# Patient Record
Sex: FEMALE | Race: WHITE | NOT HISPANIC OR LATINO | Employment: UNEMPLOYED | URBAN - METROPOLITAN AREA
[De-identification: names, ages, dates, MRNs, and addresses within clinical notes are randomized per-mention and may not be internally consistent; named-entity substitution may affect disease eponyms.]

---

## 2018-04-04 ENCOUNTER — OFFICE VISIT (OUTPATIENT)
Dept: INTERNAL MEDICINE CLINIC | Facility: CLINIC | Age: 33
End: 2018-04-04
Payer: COMMERCIAL

## 2018-04-04 VITALS
SYSTOLIC BLOOD PRESSURE: 164 MMHG | DIASTOLIC BLOOD PRESSURE: 88 MMHG | TEMPERATURE: 99.7 F | HEART RATE: 128 BPM | OXYGEN SATURATION: 99 % | BODY MASS INDEX: 21.99 KG/M2 | WEIGHT: 128.8 LBS | HEIGHT: 64 IN | RESPIRATION RATE: 16 BRPM

## 2018-04-04 DIAGNOSIS — Z13.6 SCREENING FOR HEART DISEASE: ICD-10-CM

## 2018-04-04 DIAGNOSIS — F41.9 ANXIETY DISORDER, UNSPECIFIED TYPE: ICD-10-CM

## 2018-04-04 DIAGNOSIS — K21.9 GASTROESOPHAGEAL REFLUX DISEASE, ESOPHAGITIS PRESENCE NOT SPECIFIED: ICD-10-CM

## 2018-04-04 DIAGNOSIS — F33.9 EPISODE OF RECURRENT MAJOR DEPRESSIVE DISORDER, UNSPECIFIED DEPRESSION EPISODE SEVERITY (HCC): Primary | ICD-10-CM

## 2018-04-04 DIAGNOSIS — M79.10 MYALGIA: ICD-10-CM

## 2018-04-04 DIAGNOSIS — Z00.00 HEALTHCARE MAINTENANCE: ICD-10-CM

## 2018-04-04 PROCEDURE — 99204 OFFICE O/P NEW MOD 45 MIN: CPT | Performed by: PHYSICIAN ASSISTANT

## 2018-04-04 RX ORDER — FLUOXETINE HYDROCHLORIDE 20 MG/1
20 CAPSULE ORAL DAILY
Qty: 30 EACH | Refills: 5 | Status: SHIPPED | OUTPATIENT
Start: 2018-04-04 | End: 2018-08-17

## 2018-04-04 RX ORDER — IBUPROFEN 200 MG
TABLET ORAL EVERY 6 HOURS PRN
COMMUNITY

## 2018-04-04 RX ORDER — NAPROXEN 250 MG/1
250 TABLET ORAL 2 TIMES DAILY WITH MEALS
COMMUNITY
End: 2018-08-13 | Stop reason: ALTCHOICE

## 2018-04-04 NOTE — PATIENT INSTRUCTIONS
Patient currently very symptomatic with her anxiety/depression  Will reinstitute fluoxetine 20 mg daily  I have asked her to avoid becoming pregnant  I will also refer her to Gynecology  I have asked her to discuss with gynecology a safe antidepressant to use during pregnancy if needed  Will also do labs and schedule follow-up in 1 month

## 2018-04-04 NOTE — PROGRESS NOTES
Assessment/Plan:     Patient Instructions   Patient currently very symptomatic with her anxiety/depression  Will reinstitute fluoxetine 20 mg daily  I have asked her to avoid becoming pregnant  I will also refer her to Gynecology  I have asked her to discuss with gynecology a safe antidepressant to use during pregnancy if needed  Will also do labs and schedule follow-up in 1 month  Total time spent was greater than 40 minutes face to face of which greater than 50 percent was  for counseling and coordination of care, most of the time spent on counseling and discussing patient's symptomatology  Patient was advised to restart counseling  Followup scheduled per orders  Diagnoses and all orders for this visit:    Episode of recurrent major depressive disorder, unspecified depression episode severity (HCC)  -     Fluoxetine HCl, PMDD, 20 MG CAPS; Take 1 capsule (20 mg total) by mouth daily    Anxiety disorder, unspecified type  -     TSH, 3rd generation; Future  -     T4, free; Future  -     Comprehensive metabolic panel; Future    Gastroesophageal reflux disease, esophagitis presence not specified  -     CBC and differential; Future    Screening for heart disease  -     Lipid panel; Future    Healthcare maintenance  -     Ambulatory referral to Gynecology; Future    Myalgia  -     Vitamin D 25 hydroxy; Future    Other orders  -     naproxen (NAPROSYN) 250 mg tablet; Take 250 mg by mouth 2 (two) times a day with meals  -     ibuprofen (MOTRIN) 200 mg tablet; Take by mouth every 6 (six) hours as needed for mild pain          Subjective:      Patient ID: Treasure Guevara is a 28 y o  female  70-year-old white female presents to establish with this practice  She presents with multitude of symptoms that are nonspecific but self admit she believes that they are 90% related to anxiety/depression  She had been a previous patient that had been followed in our other office    Patient does report that in her past she had been on fluoxetine 20 mg daily and had found this to be very beneficial   She had stopped it when she lost health benefits and could not afford the medication  She did gradually noted that the symptoms began worsening to the point where they are today  She describes intermittent GERD symptoms, myalgias, muscle cramps, night sweats, intermittent constipation and diarrhea, the development of some hemorrhoids, and if allowed would have had a totally positive review of systems  No chest pain at this time, shortness of breath, palpitations  She does have a past history of concussion with history of post concussion headaches  She did remark also in her past she had seen a therapist but had also stopped this  ALLERGIES:  No Known Allergies    CURRENT MEDICATIONS:    Current Outpatient Prescriptions:     ibuprofen (MOTRIN) 200 mg tablet, Take by mouth every 6 (six) hours as needed for mild pain, Disp: , Rfl:     naproxen (NAPROSYN) 250 mg tablet, Take 250 mg by mouth 2 (two) times a day with meals, Disp: , Rfl:     Fluoxetine HCl, PMDD, 20 MG CAPS, Take 1 capsule (20 mg total) by mouth daily, Disp: 30 each, Rfl: 5    ACTIVE PROBLEM LIST:  Patient Active Problem List   Diagnosis    Anxiety disorder    Intractable chronic migraine without aura    Episode of recurrent major depressive disorder (HCC)    Gastroesophageal reflux disease       PAST MEDICAL HISTORY:  No past medical history on file  PAST SURGICAL HISTORY:  Past Surgical History:   Procedure Laterality Date    NO PAST SURGERIES         FAMILY HISTORY:  Family History   Problem Relation Age of Onset    Hypothyroidism Mother        SOCIAL HISTORY:  Social History     Social History    Marital status: Single     Spouse name: N/A    Number of children: N/A    Years of education: N/A     Occupational History    Not on file       Social History Main Topics    Smoking status: Current Every Day Smoker    Smokeless tobacco: Never Used    Alcohol use Yes    Drug use: No    Sexual activity: Not on file     Other Topics Concern    Not on file     Social History Narrative    No narrative on file       Review of Systems   Constitutional: Positive for activity change and appetite change  Negative for chills, fatigue and fever  HENT: Negative for congestion  Eyes: Negative for discharge  Respiratory: Negative for cough, chest tightness and shortness of breath  Cardiovascular: Negative for chest pain, palpitations and leg swelling  Gastrointestinal: Positive for abdominal pain, constipation and diarrhea  Endocrine: Negative for polydipsia, polyphagia and polyuria  Genitourinary: Positive for vaginal bleeding (Just finished menstrual period)  Negative for difficulty urinating, frequency and urgency  Musculoskeletal: Negative for arthralgias and myalgias  Skin: Negative for rash  Allergic/Immunologic: Negative for immunocompromised state  Neurological: Positive for light-headedness and headaches  Negative for dizziness, syncope and weakness  Hematological: Negative for adenopathy  Does not bruise/bleed easily  Psychiatric/Behavioral: Negative for dysphoric mood and suicidal ideas  The patient is nervous/anxious            Objective:  Vitals:    04/04/18 0945   BP: 164/88   BP Location: Left arm   Patient Position: Sitting   Cuff Size: Adult   Pulse: (!) 128   Resp: 16   Temp: 99 7 °F (37 6 °C)   SpO2: 99%   Weight: 58 4 kg (128 lb 12 8 oz)   Height: 5' 4" (1 626 m)        Physical Exam    GENERAL-well-developed well-nourished in no acute distress  SKIN-Warm dry turgor good, no excoriations or lesions  HEAD-normocephalic, facial movement symmetrical  EYES-PERRLA, EOMI, conjunctiva pink, sclera white, no corneal opacities, fundi not examined  EARS-tympanic membranes well visualized, normal light reflexes and landmarks, no bulging or perforation  NOSE-patent bilaterally no septal deviations or perforations  THROAT-gingiva and mucosa are pink, tongue protrudes in midline, uvula rises on phonation, dry mouth  NECK-supple no adenopathy, no thyromegaly,JVD, no carotid bruits  THORAX-expands symmetrically, no axillary adenopathy  HEART-tones regular without murmurs gallops or rubs, rate 88, BP retaken 136/80  LUNGS-clear auscultation, no rales rhonchi wheezes rubs  ABDOMEN-bowel sounds active, soft, no masses tenderness or organomegaly, no guarding or rebound tenderness, no CVA tenderness  EXTREMITIES-symmetrical, no acute joint swelling or tenderness, no edema, pedal pulses 2+ normal and equal bilaterally  NEUROLOGIC-patient is awake, alert, and oriented x3, cranial nerves 2-12 are intact, deep tendon reflexes are equal bilaterally,strength equal bilaterally gross sensory and motor functions are intact, cerebral functions intact, cerebellar functions intact, Romberg negative  Vitals and Nursing Note Reviewed  RESULTS:    No results found for this or any previous visit (from the past 1008 hour(s))

## 2018-04-11 ENCOUNTER — APPOINTMENT (OUTPATIENT)
Dept: LAB | Facility: CLINIC | Age: 33
End: 2018-04-11
Payer: COMMERCIAL

## 2018-04-11 DIAGNOSIS — K21.9 GASTROESOPHAGEAL REFLUX DISEASE, ESOPHAGITIS PRESENCE NOT SPECIFIED: ICD-10-CM

## 2018-04-11 DIAGNOSIS — Z13.6 SCREENING FOR HEART DISEASE: ICD-10-CM

## 2018-04-11 DIAGNOSIS — M79.10 MYALGIA: ICD-10-CM

## 2018-04-11 DIAGNOSIS — F41.9 ANXIETY DISORDER, UNSPECIFIED TYPE: ICD-10-CM

## 2018-04-11 LAB
25(OH)D3 SERPL-MCNC: 16.5 NG/ML (ref 30–100)
ALBUMIN SERPL BCP-MCNC: 4.3 G/DL (ref 3.5–5)
ALP SERPL-CCNC: 74 U/L (ref 46–116)
ALT SERPL W P-5'-P-CCNC: 14 U/L (ref 12–78)
ANION GAP SERPL CALCULATED.3IONS-SCNC: 5 MMOL/L (ref 4–13)
AST SERPL W P-5'-P-CCNC: 14 U/L (ref 5–45)
BASOPHILS # BLD AUTO: 0.02 THOUSANDS/ΜL (ref 0–0.1)
BASOPHILS NFR BLD AUTO: 0 % (ref 0–1)
BILIRUB SERPL-MCNC: 0.68 MG/DL (ref 0.2–1)
BUN SERPL-MCNC: 6 MG/DL (ref 5–25)
CALCIUM SERPL-MCNC: 9.1 MG/DL
CHLORIDE SERPL-SCNC: 103 MMOL/L (ref 100–108)
CHOLEST SERPL-MCNC: 164 MG/DL (ref 50–200)
CO2 SERPL-SCNC: 28 MMOL/L (ref 21–32)
CREAT SERPL-MCNC: 0.69 MG/DL (ref 0.6–1.3)
EOSINOPHIL # BLD AUTO: 0.11 THOUSAND/ΜL (ref 0–0.61)
EOSINOPHIL NFR BLD AUTO: 2 % (ref 0–6)
ERYTHROCYTE [DISTWIDTH] IN BLOOD BY AUTOMATED COUNT: 13.7 % (ref 11.6–15.1)
GFR SERPL CREATININE-BSD FRML MDRD: 116 ML/MIN/1.73SQ M
GLUCOSE P FAST SERPL-MCNC: 91 MG/DL (ref 65–99)
HCT VFR BLD AUTO: 42.4 % (ref 34.8–46.1)
HDLC SERPL-MCNC: 61 MG/DL (ref 40–60)
HGB BLD-MCNC: 14.1 G/DL (ref 11.5–15.4)
LDLC SERPL CALC-MCNC: 89 MG/DL (ref 0–100)
LYMPHOCYTES # BLD AUTO: 1.48 THOUSANDS/ΜL (ref 0.6–4.47)
LYMPHOCYTES NFR BLD AUTO: 22 % (ref 14–44)
MCH RBC QN AUTO: 32.4 PG (ref 26.8–34.3)
MCHC RBC AUTO-ENTMCNC: 33.3 G/DL (ref 31.4–37.4)
MCV RBC AUTO: 98 FL (ref 82–98)
MONOCYTES # BLD AUTO: 0.69 THOUSAND/ΜL (ref 0.17–1.22)
MONOCYTES NFR BLD AUTO: 10 % (ref 4–12)
NEUTROPHILS # BLD AUTO: 4.48 THOUSANDS/ΜL (ref 1.85–7.62)
NEUTS SEG NFR BLD AUTO: 66 % (ref 43–75)
NONHDLC SERPL-MCNC: 103 MG/DL
NRBC BLD AUTO-RTO: 0 /100 WBCS
PLATELET # BLD AUTO: 414 THOUSANDS/UL (ref 149–390)
PMV BLD AUTO: 9 FL (ref 8.9–12.7)
POTASSIUM SERPL-SCNC: 3.4 MMOL/L (ref 3.5–5.3)
PROT SERPL-MCNC: 8.1 G/DL (ref 6.4–8.2)
RBC # BLD AUTO: 4.35 MILLION/UL (ref 3.81–5.12)
SODIUM SERPL-SCNC: 136 MMOL/L (ref 136–145)
T4 FREE SERPL-MCNC: 0.9 NG/DL (ref 0.76–1.46)
TRIGL SERPL-MCNC: 72 MG/DL
TSH SERPL DL<=0.05 MIU/L-ACNC: 1.75 UIU/ML (ref 0.36–3.74)
WBC # BLD AUTO: 6.8 THOUSAND/UL (ref 4.31–10.16)

## 2018-04-11 PROCEDURE — 84439 ASSAY OF FREE THYROXINE: CPT

## 2018-04-11 PROCEDURE — 82306 VITAMIN D 25 HYDROXY: CPT

## 2018-04-11 PROCEDURE — 80053 COMPREHEN METABOLIC PANEL: CPT

## 2018-04-11 PROCEDURE — 84443 ASSAY THYROID STIM HORMONE: CPT

## 2018-04-11 PROCEDURE — 80061 LIPID PANEL: CPT

## 2018-04-11 PROCEDURE — 36415 COLL VENOUS BLD VENIPUNCTURE: CPT

## 2018-04-11 PROCEDURE — 85025 COMPLETE CBC W/AUTO DIFF WBC: CPT

## 2018-05-04 ENCOUNTER — OFFICE VISIT (OUTPATIENT)
Dept: OBGYN CLINIC | Age: 33
End: 2018-05-04
Payer: COMMERCIAL

## 2018-05-04 VITALS
WEIGHT: 132 LBS | DIASTOLIC BLOOD PRESSURE: 64 MMHG | SYSTOLIC BLOOD PRESSURE: 110 MMHG | HEIGHT: 64 IN | BODY MASS INDEX: 22.53 KG/M2

## 2018-05-04 DIAGNOSIS — R10.2 PELVIC PAIN: ICD-10-CM

## 2018-05-04 DIAGNOSIS — Z01.411 ENCOUNTER FOR GYNECOLOGICAL EXAMINATION (GENERAL) (ROUTINE) WITH ABNORMAL FINDINGS: Primary | ICD-10-CM

## 2018-05-04 PROCEDURE — 87624 HPV HI-RISK TYP POOLED RSLT: CPT | Performed by: NURSE PRACTITIONER

## 2018-05-04 PROCEDURE — G0124 SCREEN C/V THIN LAYER BY MD: HCPCS | Performed by: PATHOLOGY

## 2018-05-04 PROCEDURE — 99385 PREV VISIT NEW AGE 18-39: CPT | Performed by: NURSE PRACTITIONER

## 2018-05-04 PROCEDURE — G0145 SCR C/V CYTO,THINLAYER,RESCR: HCPCS | Performed by: PATHOLOGY

## 2018-05-04 NOTE — PATIENT INSTRUCTIONS
Planning for Pregnancy   AMBULATORY CARE:   Why you should plan for pregnancy:  There are things you can do to get your body ready for a healthy pregnancy  A healthy pregnancy can improve your chance of having a healthy baby  The steps you need to take and the amount of time needed depends on your current health and habits  Work with your healthcare provider to help you plan a healthy pregnancy  What you need to know about nutrition and exercise before pregnancy:   · Eat a variety of healthy foods  Healthy foods include fruits, vegetables, whole-grain breads, low-fat dairy foods, beans, lean meats, and fish  Limit foods high in sugar, fat, and sodium  Limit your intake of fish to 2 servings each week  Choose fish low in mercury such as canned light tuna, shrimp, salmon, cod, or tilapia  Do not  eat fish high in mercury such as swordfish, tilefish, leeanne mackerel, and shark  · Take 400 micrograms (mcg) of folic acid each day  This will help to prevent birth defects of the brain and spine such as spina bifida  Most women should take folic acid before pregnancy and up to 12 weeks after getting pregnant  · Exercise for at least 30 minutes, 5 days a week  Some examples of exercise include walking, biking, dancing, and swimming  Include muscle strengthening activities 2 days each week  Regular exercise provides many health benefits  It helps you manage your weight, and decreases your risk for type 2 diabetes, heart disease, stroke, and high blood pressure  Exercise can also help improve your mood  Ask your healthcare provider about the best exercise plan for you  How weight affects pregnancy:   · Obesity  can make it harder for you to get pregnant  It also increases your risk of health problems during pregnancy  Some of these health problems include gestational diabetes, high blood pressure, and infections  It can also increase your baby's risk of health problems such as birth defects   Your baby may also be large and harder to deliver or be born prematurely (early)  Your risk of miscarriage is also higher if you are obese  Work with your healthcare provider to reach a healthy weight before you try to get pregnant  · Being underweight  can also make it hard for you to get pregnant  It can also increase your risk of having a premature baby and miscarriage  Your baby may be born at a low birth weight  What you need to know about smoking, alcohol, and drugs:   · Smoking  increases your risk of a miscarriage and other health problems during pregnancy  Smoking can cause your baby to be born too early or weigh less at birth  Ask your healthcare provider for information if you need help quitting  · Alcohol  passes from your body to your baby through the placenta  It can affect your baby's brain development and cause fetal alcohol syndrome (FAS)  FAS is a group of conditions that causes mental, behavior, and growth problems  Talk to your healthcare provider if you abuse alcohol and need help quitting before pregnancy  · Drugs , such as marijuana and cocaine, should not be used while you are trying to get pregnant or during pregnancy  They increase your risk of problems during pregnancy and increase the risk of having a baby with health problems  These include birth defects, premature birth, and infant death  What you need to know about medicines and supplements:  Tell your healthcare provider about all the medicines and supplements you take  Certain medicines and supplements should not be used during pregnancy  These include over-the-counter medicines, prescription medicines, vitamins, and herbal supplements  He or she may recommend that you take different medicines that are safer during pregnancy  What you need to know about immunizations:  Tell your healthcare provider about all the immunizations you have had   If you have missed any immunizations, your healthcare provider may recommend that you update your immunizations  These include hepatitis B, influenza, MMR (measles, mumps, rubella), Tdap, and varicella immunizations  Tests you may need to have before pregnancy:  Your healthcare provider may recommend that you have tests to screen for sexually transmitted infections  These include chlamydia, gonorrhea, herpes, HIV infection, syphilis, and tuberculosis  These infectious diseases should be treated before pregnancy, if needed  What you need to know about toxic substances:  Toxic substances can harm a developing baby  Examples include cleaning products, paints, solvents, pesticides, and other chemical products  They can increase the risk of having a miscarriage, premature birth, and low-birth weight baby  They also increase the risk of developmental delay and childhood cancer  Avoid exposure to toxic substances and materials at work and home  What you need to know about genetic testing:  Tell your healthcare provider about your and your partner's family history of genetic disorders, developmental delays, or other disabilities  Also tell your healthcare provider about any problems you have had in previous pregnancies  Your healthcare provider may recommend that you see a healthcare professional called a genetic counselor  He or she will talk to you about how genes, birth defects, and other medical conditions are passed down  He or she can also tell you about your risk for passing a genetic disease in a future pregnancy  How you can prepare for pregnancy if you have a medical condition:  Medical conditions such as diabetes, high blood pressure, asthma, seizure disorders, and thyroid disorders should be managed before pregnancy  Mental health conditions, such as depression and anxiety, should also be treated  This will decrease your risk of having health problems during pregnancy  It will also decrease your baby's risk of medical problems   Medicines used to treat certain conditions are not safe to use during pregnancy and may need to be changed before you get pregnant  Ask your healthcare provider if it is safe for you to get pregnant if you have a medical condition  © 2017 [employer] [employer] Information is for End User's use only and may not be sold, redistributed or otherwise used for commercial purposes  All illustrations and images included in CareNotes® are the copyrighted property of [employer]  or [employer]  The above information is an  only  It is not intended as medical advice for individual conditions or treatments  Talk to your doctor, nurse or pharmacist before following any medical regimen to see if it is safe and effective for you

## 2018-05-04 NOTE — PROGRESS NOTES
Assessment/Plan:    No problem-specific Assessment & Plan notes found for this encounter  Diagnoses and all orders for this visit:    Encounter for gynecological examination (general) (routine) with abnormal findings  -     Liquid-based pap, screening    Pelvic pain  -     US pelvis complete w transvaginal; Future        Call as needed, encouraged calcium/vit D/PNV in her diet, all questions answered    Subjective:      Patient ID: Orie Baumgarten is a 28 y o  female  Pleasant 28 y o  premenopausal female here for annual exam  She denies any issues with menstrual/vaginal bleeding  Denies history of abnormal pap smears, last Pap NIL 2012, pap today  Denies vaginal issues  + occas pelvic pain which worries her bc she was told of enlarged ovaries yrs ago  Currently on prozac but will talk to pcp about switching to zoloft bc is thinking about pregnancy  The following portions of the patient's history were reviewed and updated as appropriate:   She  has a past medical history of Anxiety; Depression; and Enlarged ovary  She   Patient Active Problem List    Diagnosis Date Noted    Encounter for gynecological examination (general) (routine) with abnormal findings 05/04/2018    Pelvic pain 05/04/2018    Episode of recurrent major depressive disorder ([facility] [address] ) 04/04/2018    Gastroesophageal reflux disease 04/04/2018    Intractable chronic migraine without aura 01/09/2015    Anxiety disorder 08/08/2014     She  has a past surgical history that includes No past surgeries  Her family history includes Diabetes in her sister; Heart disease in her maternal grandfather; Hypothyroidism in her mother and sister; Lung cancer in her paternal grandfather; Migraines in her sister  She  reports that she has been smoking Cigarettes  She has never used smokeless tobacco  She reports that she drinks alcohol  She reports that she does not use drugs    Current Outpatient Prescriptions   Medication Sig Dispense Refill    Fluoxetine HCl, PMDD, 20 MG CAPS Take 1 capsule (20 mg total) by mouth daily 30 each 5    ibuprofen (MOTRIN) 200 mg tablet Take by mouth every 6 (six) hours as needed for mild pain      naproxen (NAPROSYN) 250 mg tablet Take 250 mg by mouth 2 (two) times a day with meals       No current facility-administered medications for this visit  She has No Known Allergies  OB History    Para Term  AB Living   0 0 0 0 0 0   SAB TAB Ectopic Multiple Live Births   0 0 0 0 0             Thinking about pregnancy  Review of Systems      Objective:      /64 (BP Location: Right arm, Patient Position: Sitting)   Ht 5' 4" (1 626 m)   Wt 59 9 kg (132 lb)   LMP 2018   Breastfeeding? No   BMI 22 66 kg/m²          Physical Exam    Review of Systems   Constitutional: Negative for chills, fatigue, fever and unexpected weight change  Respiratory: Negative for shortness of breath  Gastrointestinal: Negative for anal bleeding, blood in stool, constipation and diarrhea  Genitourinary: Negative for difficulty urinating, dysuria and hematuria  Physical Exam   Constitutional: She appears well-developed and well-nourished  No distress  HENT:   Head: Normocephalic  Neck: Normal range of motion  Neck supple  Pulmonary/Chest: Effort normal  Right breast exhibits no inverted nipple, no mass, no nipple discharge, no skin change and no tenderness  Left breast exhibits no inverted nipple, no mass, no nipple discharge, no skin change and no tenderness  Breasts are symmetrical    Abdominal: Soft  Genitourinary: No breast swelling, tenderness, discharge or bleeding  Pelvic exam was performed with patient supine  No labial fusion  There is rhys rash, tenderness, lesion or injury on the right labia  There is no rash, tenderness, lesion or injury on the left labia  Uterus is not deviated, not enlarged, not fixed and not tender  Cervix exhibits no motion tenderness, no discharge and no friability   Right adnexum displays no mass, no tenderness and no fullness  Left adnexum displays no mass, no tenderness and no fullness  No erythema or tenderness in the vagina  No foreign body in the vagina  No signs of injury around the vagina  No vaginal discharge found  Lymphadenopathy:        Right: No inguinal adenopathy present  Left: No inguinal adenopathy present

## 2018-05-09 LAB — HPV RRNA GENITAL QL NAA+PROBE: ABNORMAL

## 2018-05-11 ENCOUNTER — TELEPHONE (OUTPATIENT)
Dept: OBGYN CLINIC | Facility: CLINIC | Age: 33
End: 2018-05-11

## 2018-05-11 LAB
LAB AP GYN PRIMARY INTERPRETATION: NORMAL
Lab: NORMAL
PATH INTERP SPEC-IMP: NORMAL

## 2018-05-11 NOTE — TELEPHONE ENCOUNTER
Reviewed results with patient thoroughly  All questions answered regarding pap results and HPV virus  Patient never had Gardasil series  Colpo scheduled for 5/22  Advised Motrin 600mg with breakfast prior to appt  Patient verbalized understanding

## 2018-05-11 NOTE — TELEPHONE ENCOUNTER
----- Message from Eldon Hanna, [address]  sent at 5/11/2018 10:55 AM EDT -----  Please arrange for colposcopy, pap was LSIL cannot r/o HSIL and HPV 16+   Thanks

## 2018-05-14 ENCOUNTER — HOSPITAL ENCOUNTER (OUTPATIENT)
Dept: ULTRASOUND IMAGING | Facility: HOSPITAL | Age: 33
Discharge: HOME | End: 2018-05-14
Payer: COMMERCIAL

## 2018-05-14 ENCOUNTER — TELEPHONE (OUTPATIENT)
Dept: OBGYN CLINIC | Facility: CLINIC | Age: 33
End: 2018-05-14

## 2018-05-14 DIAGNOSIS — R10.2 PELVIC PAIN: ICD-10-CM

## 2018-05-14 DIAGNOSIS — N80.1 ENDOMETRIOMA OF OVARY: Primary | ICD-10-CM

## 2018-05-14 PROCEDURE — 76856 US EXAM PELVIC COMPLETE: CPT

## 2018-05-14 PROCEDURE — 76830 TRANSVAGINAL US NON-OB: CPT

## 2018-05-14 NOTE — TELEPHONE ENCOUNTER
----- [facility] [address] sent at 5/14/2018  2:11 PM EDT -----  Please notify pelvic u/s was normal except for an ovarian cyst on her left  She also has a possible endometrioma on her left ovary which has possibly caused the "enlarged" ovary she was told she has  Pls arrange for 8 wks f/u ultrasound  Thanks

## 2018-05-14 NOTE — TELEPHONE ENCOUNTER
Spoke with Pt today via phone call  Pt informed that Varun Abernathy reviewed Pt's recent pelvic ultrasound result ; Pelvic ultrasound was normal except for a left ovarian cyst and possible endometrioma on left ovary per STEPHANIE Rees's review  Pt further informed said endometrioma possibly is the cause of the "enlarged" ovary she was told she has  Pt advised that she should complete repeat pelvic ultrasound in 8 weeks as recommended by Varun Abernathy  Radiology order for US pelvis complete w transvaginal (effective date 7/9/18) completed in [facility] [address]  Copy of said order mailed to Pt's mailing address listed in Pt's EHR per Pt's request   Reiterated to Pt that if her symptoms worsen or she has any questions/concerns, to contact office

## 2018-05-22 ENCOUNTER — PROCEDURE VISIT (OUTPATIENT)
Dept: OBGYN CLINIC | Age: 33
End: 2018-05-22
Payer: COMMERCIAL

## 2018-05-22 VITALS
WEIGHT: 123 LBS | HEIGHT: 64 IN | BODY MASS INDEX: 21 KG/M2 | SYSTOLIC BLOOD PRESSURE: 120 MMHG | DIASTOLIC BLOOD PRESSURE: 68 MMHG

## 2018-05-22 DIAGNOSIS — R87.810 CERVICAL HIGH RISK HPV (HUMAN PAPILLOMAVIRUS) TEST POSITIVE: ICD-10-CM

## 2018-05-22 DIAGNOSIS — R87.612 LOW GRADE SQUAMOUS INTRAEPITH LESION ON CYTOLOGIC SMEAR CERVIX (LGSIL): Primary | ICD-10-CM

## 2018-05-22 PROCEDURE — 88344 IMHCHEM/IMCYTCHM EA MLT ANTB: CPT | Performed by: PATHOLOGY

## 2018-05-22 PROCEDURE — 88305 TISSUE EXAM BY PATHOLOGIST: CPT | Performed by: PATHOLOGY

## 2018-05-22 PROCEDURE — 57454 BX/CURETT OF CERVIX W/SCOPE: CPT | Performed by: NURSE PRACTITIONER

## 2018-05-22 NOTE — PATIENT INSTRUCTIONS
HPV (Human Papillomavirus)   AMBULATORY CARE:   Human Papillomavirus (HPV)  is the most common infection spread by sexual contact  It can also be spread from a mother to her baby during delivery  HPV may cause oral and genital warts or tumors in your nose, mouth, throat, and lungs  HPV may also cause vaginal, penile, and anal cancers  You may not show symptoms of any of these conditions for several years after being exposed to HPV  Common symptoms include the following:   · Painless warts    · Genital or anal discharge, bleeding, itching, or pain    · Pain when you urinate  Contact your healthcare provider if:   · You have warts in your genital or anal area  · You have genital or anal discharge, bleeding, itching, or pain  · You have pain when you urinate  · You have questions or concerns about your condition or care  Treatment for HPV  includes relieving symptoms  There is no cure for HPV  There is treatment for the conditions that are caused by HPV  You will need to be closely monitored for these conditions  Ask your healthcare provider for more information about monitoring, conditions caused by HPV, and treatments  Prevent HPV infection:  Vaccinations can help stop the spread of HPV  The vaccine is most effective if given before sexual activity begins  This allows your body to build almost complete protection against HPV before having contact with the virus  The HPV vaccine is still effective up to the age of 32 if it is given after sexual activity has already begun  Who should get the HPV vaccine: The first dose of the vaccine may be given as early as 5years of age   The following should also get the vaccine:  · All females and males 6to 15years of age    · Females 15 through 32years of age, and males 15 through 24years of age, who have not been vaccinated     · Men up to 32years of age who have sex with other men, and have not been vaccinated     · Anyone with a weak immune system, including infection with HIV, up to 32years of age  Who should not get the vaccine or should wait to get it:  Do not get a second dose if you had a severe allergic reaction to the first dose  Do not get the vaccine if you are pregnant  If you are sick, wait to get the vaccine until symptoms go away  How the vaccine is given:  The HPV vaccine can be given with other vaccinations  The vaccine is given in 3 doses to adults:  · The first dose  is given at any time  · The second dose  is given 1 to 2 months after the first dose  · The third dose  is given 6 months after the first dose  More information about how the vaccine is given: You may need 1 more dose of the HPV vaccine if any of the following is true:  · You only received 1 dose of the HPV vaccine before 13years of age    · You received 2 doses of the HPV vaccine less than 5 months apart before 13years of age  Follow up with your healthcare provider as directed:  Write down your questions so you remember to ask them during your visits  © 2017 [facility] Information is for End User's use only and may not be sold, redistributed or otherwise used for commercial purposes  All illustrations and images included in CareNotes® are the copyrighted property of [employer] M [employer]  or [employer]  The above information is an  only  It is not intended as medical advice for individual conditions or treatments  Talk to your doctor, nurse or pharmacist before following any medical regimen to see if it is safe and effective for you

## 2018-05-22 NOTE — PROGRESS NOTES
Colposcopy  Date/Time: 5/22/2018 9:54 AM  Performed by: Julienne Saez by: [facility]     Consent:     Consent obtained:  Written    Consent given by:  Patient (First abnml pap  LSIL cannot r/o HSIL, HPV 16 +)    Procedural risks discussed:  Bleeding, infection, possible continued pain and repeat procedure    Patient questions answered: yes      Patient agrees, verbalizes understanding, and wants to proceed: yes      Educational handouts given: yes      Instructions and paperwork completed: yes    Pre-procedure:     Pre-procedure timeout performed: yes      Prepped with: acetic acid and Lugol    Indication:     Indication:  LSIL  Procedure:     Procedure: Colposcopy w/ cervical biopsy and ECC      Under satisfactory analgesia the patient was prepped and draped in the dorsal lithotomy position: yes      Trenton speculum was placed in the vagina: no      Under colposcopic examination the transition zone was seen in entirety: yes      Intracervical block was performed: no      Endocervix was curetted using a Kevorkian curette: yes      Cervical biopsy performed with a cervical biopsy punch: yes      Tampon inserted: no      Monsel's solution was applied: yes      Biopsy(s): yes      Location:  [de-identified]    Specimen to pathology: yes    Post-procedure:     Findings: White epithelium      Impression: Low grade cervical dysplasia      Patient tolerance of procedure: Tolerated well, no immediate complications  Comments:      LEEP info given  Discussed u/s and will repeat it in 8 wks

## 2018-05-31 ENCOUNTER — TELEPHONE (OUTPATIENT)
Dept: OBGYN CLINIC | Facility: CLINIC | Age: 33
End: 2018-05-31

## 2018-05-31 NOTE — TELEPHONE ENCOUNTER
Spoke with Pt today via phone call  Pt informed that her recent tissue exam result was positive for HPV 16 and high-grade squamos intraepithelial lesion (moderate to severe dysplasia) per STEPHANIE Rees's review  Pt states she is scheduled for LEEP procedure to address abnormal tissue exam result  Reiterated to Pt that if she has any questions/concerns, to contact office

## 2018-05-31 NOTE — TELEPHONE ENCOUNTER
Left voicemail message today @ [phone] for Pt to call back office regarding recent tissue exam result

## 2018-06-05 ENCOUNTER — PROCEDURE VISIT (OUTPATIENT)
Dept: OBGYN CLINIC | Age: 33
End: 2018-06-05
Payer: COMMERCIAL

## 2018-06-05 VITALS — DIASTOLIC BLOOD PRESSURE: 72 MMHG | BODY MASS INDEX: 20.94 KG/M2 | SYSTOLIC BLOOD PRESSURE: 108 MMHG | WEIGHT: 122 LBS

## 2018-06-05 DIAGNOSIS — G89.18 POSTOPERATIVE PAIN: ICD-10-CM

## 2018-06-05 DIAGNOSIS — D06.1 CARCINOMA IN SITU OF EXOCERVIX: ICD-10-CM

## 2018-06-05 DIAGNOSIS — R87.810 CERVICAL HIGH RISK HPV (HUMAN PAPILLOMAVIRUS) TEST POSITIVE: Primary | ICD-10-CM

## 2018-06-05 PROCEDURE — 88307 TISSUE EXAM BY PATHOLOGIST: CPT | Performed by: PATHOLOGY

## 2018-06-05 PROCEDURE — 57522 CONIZATION OF CERVIX: CPT | Performed by: OBSTETRICS & GYNECOLOGY

## 2018-06-05 RX ORDER — IBUPROFEN 800 MG/1
800 TABLET ORAL EVERY 6 HOURS PRN
Qty: 30 TABLET | Refills: 0 | Status: SHIPPED | OUTPATIENT
Start: 2018-06-05 | End: 2018-08-13 | Stop reason: ALTCHOICE

## 2018-06-05 NOTE — PATIENT INSTRUCTIONS
Cervical Cone Biopsy   WHAT YOU NEED TO KNOW:   A cervical cone biopsy is surgery to remove abnormal cells from your cervix  The cervix is the opening into your uterus  Once the cells are removed, they are sent to a lab to be tested for cancer  DISCHARGE INSTRUCTIONS:   Medicines:   · Pain medicine: You may be given a prescription medicine to decrease pain  Do not wait until the pain is severe before you take this medicine  · Antibiotics:  Antibiotics may be given to help prevent an infection caused by bacteria  · Take your medicine as directed  Contact your healthcare provider if you think your medicine is not helping or if you have side effects  Tell him or her if you are allergic to any medicine  Keep a list of the medicines, vitamins, and herbs you take  Include the amounts, and when and why you take them  Bring the list or the pill bottles to follow-up visits  Carry your medicine list with you in case of an emergency  Follow up with your healthcare provider or gynecologist as directed: You may need to return to make sure your cervix is healing  Write down your questions so you remember to ask them during your visits  Wound care:  Ask how to care for your wound  Do not use tampons or have sex for 2 to 3 weeks after your surgery  Do not douche during this time  Self-care:   · Practice safe sex:  Use a condom when you have sex  Do not have sex with more than one partner  This may decrease your risk for a sexually transmitted infection, such as HPV, and abnormal cervical cells  · Vaccination:  Ask your healthcare provider or gynecologist for more information about the vaccine for human papillomavirus (HPV)  The HPV vaccine may help decrease your risk for an HPV infection and abnormal cervical cells  · Pregnancy:  Talk with your healthcare provider or gynecologist if you want to become pregnant  A cervical cone biopsy may increase the risk that you deliver your baby early   You may need more care during a future pregnancy to help prevent problems  · Do not smoke: If you smoke, it is never too late to quit  If you smoke, it may take longer for your wounds to heal  Smoking also increases the risk of abnormal cells in your cervix  Ask for information if you need help quitting  Contact your healthcare provider or gynecologist if:   · You have a fever  · You have new or increased pain in your lower abdomen and pelvic area  · You have vaginal bleeding, and it is not time for your monthly period  · You feel pain when you urinate, or your urine looks cloudy  · You have questions or concerns about your condition or care  Seek care immediately or call 911 if:   · You have white or yellow vaginal discharge  · You have more vaginal bleeding than you were told to expect  © 2017 [employer]  Information is for End User's use only and may not be sold, redistributed or otherwise used for commercial purposes  All illustrations and images included in CareNotes® are the copyrighted property of A D A M , Inc  or [employer]  The above information is an  only  It is not intended as medical advice for individual conditions or treatments  Talk to your doctor, nurse or pharmacist before following any medical regimen to see if it is safe and effective for you

## 2018-06-05 NOTE — PROGRESS NOTES
Assessment/Plan:    Carcinoma in situ of exocervix  LEEP completed today  Will call with pathology results       Diagnoses and all orders for this visit:    Cervical high risk HPV (human papillomavirus) test positive  -     Tissue Exam    Carcinoma in situ of exocervix  -     Tissue Exam    Other orders  -     Probiotic Product (PROBIOTIC-10 PO); Take by mouth          Subjective:      Patient ID: Luis Solis is a 28 y o  female  Patient here for Leep procedure  Patient had colposcopy done 5/22/18 ECC- normal 1'oclock- HGSIL, 3'oclock-HGSIL, 6'oclock-HGSIL, 11'oclock-HGSIL  The following portions of the patient's history were reviewed and updated as appropriate:   She  has a past medical history of Anxiety; Depression; and Enlarged ovary  She   Patient Active Problem List    Diagnosis Date Noted    Carcinoma in situ of exocervix 05/22/2018    Cervical high risk HPV (human papillomavirus) test positive 05/22/2018    Encounter for gynecological examination (general) (routine) with abnormal findings 05/04/2018    Pelvic pain 05/04/2018    Episode of recurrent major depressive disorder ([facility] [address] ) 04/04/2018    Gastroesophageal reflux disease 04/04/2018    Intractable chronic migraine without aura 01/09/2015    Anxiety disorder 08/08/2014     She  has a past surgical history that includes No past surgeries  Her family history includes Diabetes in her sister; Heart disease in her maternal grandfather; Hypothyroidism in her mother and sister; Lung cancer in her paternal grandfather; Migraines in her sister  She  reports that she has been smoking Cigarettes  She has never used smokeless tobacco  She reports that she drinks alcohol  She reports that she does not use drugs    Current Outpatient Prescriptions   Medication Sig Dispense Refill    Fluoxetine HCl, PMDD, 20 MG CAPS Take 1 capsule (20 mg total) by mouth daily 30 each 5    ibuprofen (MOTRIN) 200 mg tablet Take by mouth every 6 (six) hours as needed for mild pain      naproxen (NAPROSYN) 250 mg tablet Take 250 mg by mouth 2 (two) times a day with meals      Probiotic Product (PROBIOTIC-10 PO) Take by mouth       No current facility-administered medications for this visit  Current Outpatient Prescriptions on File Prior to Visit   Medication Sig    Fluoxetine HCl, PMDD, 20 MG CAPS Take 1 capsule (20 mg total) by mouth daily    ibuprofen (MOTRIN) 200 mg tablet Take by mouth every 6 (six) hours as needed for mild pain    naproxen (NAPROSYN) 250 mg tablet Take 250 mg by mouth 2 (two) times a day with meals     No current facility-administered medications on file prior to visit  She has No Known Allergies       Review of Systems      Objective:      /72 (BP Location: Right arm, Patient Position: Sitting, Cuff Size: Standard)   Wt 55 3 kg (122 lb)   LMP 05/23/2018 (Exact Date)   BMI 20 94 kg/m²          Physical Exam    Procedures   Excisional Biopsy Procedure Note -LEEP    Pre-operative Diagnosis: CIN3    Post-operative Diagnosis: same    Locations: Cervix    Indications: ALEXI 3    Anesthesia: Lidocaine 1% with epinephrine      Procedure Details   The risks, benefits, indications, potential complications, and alternatives were explained to the patient and informed consent obtained  A coated Graves speculum was placed into the vagina with good visualization of the cervix  1% Lidocaine with epinephrine was injected circumferentially into the cervix with good blanching of the tissue  The endocervical specimen was obtained using a wire loop under cautery  Roller ball coagulation was then used on the cervical bed for hemostasis  Monsel's was applied as well  All instruments were removed from the vagina  All sponge, lap and needle counts were correct  The specimen was sent for pathologic examination  The patient tolerated the procedure well

## 2018-08-01 ENCOUNTER — HOSPITAL ENCOUNTER (OUTPATIENT)
Dept: ULTRASOUND IMAGING | Facility: HOSPITAL | Age: 33
Discharge: HOME | End: 2018-08-01
Payer: COMMERCIAL

## 2018-08-01 ENCOUNTER — TELEPHONE (OUTPATIENT)
Dept: OBGYN CLINIC | Facility: CLINIC | Age: 33
End: 2018-08-01

## 2018-08-01 DIAGNOSIS — N80.1 ENDOMETRIOMA OF OVARY: ICD-10-CM

## 2018-08-01 PROCEDURE — 76830 TRANSVAGINAL US NON-OB: CPT

## 2018-08-01 PROCEDURE — 76856 US EXAM PELVIC COMPLETE: CPT

## 2018-08-01 NOTE — TELEPHONE ENCOUNTER
----- Message from Estefanía Means, [address] sent at 8/1/2018  1:25 PM EDT -----  Please notify patient her pelvic u/s was stable from last u/s  She has possible left endometrioma which will require a yearly follow up and the hemorrhagic cyst was smaller  Thanks

## 2018-08-01 NOTE — TELEPHONE ENCOUNTER
Spoke with Pt today via phone call  Pt informed that her recent pelvic ultrasound was stable from previous ultrasound per STEPHANIE Rees's review of US result  Pt further informed she has a possible left endometrioma (Type of cyst formed when endometrial tissue grows in ovaries, causes chronic pelvic pain associated with menstruation) which will require a yearly follow up per STEPHANIE Rees  Pt informed that previously seen hemorrhagic cyst (Adnexal mass formed because of occurrence of bleeding into a folicular or cyst) was smaller in size per STEPHANIE Rees's review of US result  Reiterated to Pt that if her symptoms worsen to contact office

## 2018-08-10 ENCOUNTER — TELEPHONE (OUTPATIENT)
Dept: OBGYN CLINIC | Facility: CLINIC | Age: 33
End: 2018-08-10

## 2018-08-10 ENCOUNTER — APPOINTMENT (EMERGENCY)
Dept: CT IMAGING | Facility: HOSPITAL | Age: 33
End: 2018-08-10
Payer: COMMERCIAL

## 2018-08-10 ENCOUNTER — HOSPITAL ENCOUNTER (EMERGENCY)
Facility: HOSPITAL | Age: 33
Discharge: HOME | End: 2018-08-10
Attending: EMERGENCY MEDICINE | Admitting: EMERGENCY MEDICINE
Payer: COMMERCIAL

## 2018-08-10 VITALS
RESPIRATION RATE: 18 BRPM | TEMPERATURE: 98.2 F | HEART RATE: 60 BPM | DIASTOLIC BLOOD PRESSURE: 66 MMHG | OXYGEN SATURATION: 100 % | SYSTOLIC BLOOD PRESSURE: 119 MMHG

## 2018-08-10 DIAGNOSIS — K62.5 RECTAL BLEEDING: Primary | ICD-10-CM

## 2018-08-10 LAB
ALBUMIN SERPL BCP-MCNC: 4 G/DL (ref 3.5–5)
ALP SERPL-CCNC: 99 U/L (ref 46–116)
ALT SERPL W P-5'-P-CCNC: 20 U/L (ref 12–78)
ANION GAP SERPL CALCULATED.3IONS-SCNC: 6 MMOL/L (ref 4–13)
APTT PPP: 29 SECONDS (ref 24–36)
AST SERPL W P-5'-P-CCNC: 20 U/L (ref 5–45)
BACTERIA UR QL AUTO: ABNORMAL /HPF
BASOPHILS # BLD AUTO: 0.04 THOUSANDS/ΜL (ref 0–0.1)
BASOPHILS NFR BLD AUTO: 1 % (ref 0–1)
BILIRUB DIRECT SERPL-MCNC: 0.11 MG/DL (ref 0–0.2)
BILIRUB SERPL-MCNC: 0.4 MG/DL (ref 0.2–1)
BILIRUB UR QL STRIP: NEGATIVE
BUN SERPL-MCNC: 8 MG/DL (ref 5–25)
CALCIUM SERPL-MCNC: 8.7 MG/DL (ref 8.3–10.1)
CHLORIDE SERPL-SCNC: 101 MMOL/L (ref 100–108)
CLARITY UR: CLEAR
CO2 SERPL-SCNC: 31 MMOL/L (ref 21–32)
COLOR UR: ABNORMAL
CREAT SERPL-MCNC: 0.54 MG/DL (ref 0.6–1.3)
EOSINOPHIL # BLD AUTO: 0.05 THOUSAND/ΜL (ref 0–0.61)
EOSINOPHIL NFR BLD AUTO: 1 % (ref 0–6)
ERYTHROCYTE [DISTWIDTH] IN BLOOD BY AUTOMATED COUNT: 14.2 % (ref 11.6–15.1)
EXT PREG TEST URINE: NEGATIVE
GFR SERPL CREATININE-BSD FRML MDRD: 125 ML/MIN/1.73SQ M
GLUCOSE SERPL-MCNC: 92 MG/DL (ref 65–140)
GLUCOSE UR STRIP-MCNC: NEGATIVE MG/DL
HCT VFR BLD AUTO: 39.7 % (ref 34.8–46.1)
HGB BLD-MCNC: 13.6 G/DL (ref 11.5–15.4)
HGB UR QL STRIP.AUTO: ABNORMAL
IMM GRANULOCYTES # BLD AUTO: 0.02 THOUSAND/UL (ref 0–0.2)
IMM GRANULOCYTES NFR BLD AUTO: 0 % (ref 0–2)
INR PPP: 1.01 (ref 0.86–1.17)
KETONES UR STRIP-MCNC: NEGATIVE MG/DL
LEUKOCYTE ESTERASE UR QL STRIP: NEGATIVE
LIPASE SERPL-CCNC: 136 U/L (ref 73–393)
LYMPHOCYTES # BLD AUTO: 1.92 THOUSANDS/ΜL (ref 0.6–4.47)
LYMPHOCYTES NFR BLD AUTO: 29 % (ref 14–44)
MCH RBC QN AUTO: 33.4 PG (ref 26.8–34.3)
MCHC RBC AUTO-ENTMCNC: 34.3 G/DL (ref 31.4–37.4)
MCV RBC AUTO: 98 FL (ref 82–98)
MONOCYTES # BLD AUTO: 0.69 THOUSAND/ΜL (ref 0.17–1.22)
MONOCYTES NFR BLD AUTO: 10 % (ref 4–12)
NEUTROPHILS # BLD AUTO: 4 THOUSANDS/ΜL (ref 1.85–7.62)
NEUTS SEG NFR BLD AUTO: 59 % (ref 43–75)
NITRITE UR QL STRIP: NEGATIVE
NON-SQ EPI CELLS URNS QL MICRO: ABNORMAL /HPF
NRBC BLD AUTO-RTO: 0 /100 WBCS
PH UR STRIP.AUTO: 6.5 [PH] (ref 4.5–8)
PLATELET # BLD AUTO: 360 THOUSANDS/UL (ref 149–390)
PMV BLD AUTO: 8.6 FL (ref 8.9–12.7)
POTASSIUM SERPL-SCNC: 3.3 MMOL/L (ref 3.5–5.3)
PROT SERPL-MCNC: 7.7 G/DL (ref 6.4–8.2)
PROT UR STRIP-MCNC: NEGATIVE MG/DL
PROTHROMBIN TIME: 13.2 SECONDS (ref 11.8–14.2)
RBC # BLD AUTO: 4.07 MILLION/UL (ref 3.81–5.12)
RBC #/AREA URNS AUTO: ABNORMAL /HPF
SODIUM SERPL-SCNC: 138 MMOL/L (ref 136–145)
SP GR UR STRIP.AUTO: 1.01 (ref 1–1.03)
UROBILINOGEN UR QL STRIP.AUTO: 0.2 E.U./DL
WBC # BLD AUTO: 6.72 THOUSAND/UL (ref 4.31–10.16)
WBC #/AREA URNS AUTO: ABNORMAL /HPF

## 2018-08-10 PROCEDURE — 83690 ASSAY OF LIPASE: CPT | Performed by: PHYSICIAN ASSISTANT

## 2018-08-10 PROCEDURE — 74177 CT ABD & PELVIS W/CONTRAST: CPT

## 2018-08-10 PROCEDURE — 96360 HYDRATION IV INFUSION INIT: CPT

## 2018-08-10 PROCEDURE — 85025 COMPLETE CBC W/AUTO DIFF WBC: CPT | Performed by: PHYSICIAN ASSISTANT

## 2018-08-10 PROCEDURE — 99285 EMERGENCY DEPT VISIT HI MDM: CPT

## 2018-08-10 PROCEDURE — 80048 BASIC METABOLIC PNL TOTAL CA: CPT | Performed by: PHYSICIAN ASSISTANT

## 2018-08-10 PROCEDURE — 81025 URINE PREGNANCY TEST: CPT | Performed by: PHYSICIAN ASSISTANT

## 2018-08-10 PROCEDURE — 80076 HEPATIC FUNCTION PANEL: CPT | Performed by: PHYSICIAN ASSISTANT

## 2018-08-10 PROCEDURE — 36415 COLL VENOUS BLD VENIPUNCTURE: CPT | Performed by: PHYSICIAN ASSISTANT

## 2018-08-10 PROCEDURE — 85610 PROTHROMBIN TIME: CPT | Performed by: PHYSICIAN ASSISTANT

## 2018-08-10 PROCEDURE — 81001 URINALYSIS AUTO W/SCOPE: CPT | Performed by: PHYSICIAN ASSISTANT

## 2018-08-10 PROCEDURE — 85730 THROMBOPLASTIN TIME PARTIAL: CPT | Performed by: PHYSICIAN ASSISTANT

## 2018-08-10 RX ADMIN — IOHEXOL 100 ML: 350 INJECTION, SOLUTION INTRAVENOUS at 17:23

## 2018-08-10 RX ADMIN — SODIUM CHLORIDE 1000 ML: 0.9 INJECTION, SOLUTION INTRAVENOUS at 16:15

## 2018-08-10 NOTE — ED PROVIDER NOTES
History  Chief Complaint   Patient presents with    Rectal Bleeding     pt presents ambulatory with c/o bright red rectal bleeding since 62       40-year-old female patient here for evaluation of bright red blood per rectum  Symptoms for about 6-8 days now  Only occurs with bowel movements  Notes blood on the toilet paper when she wipes  She has history of hemorrhoids, self-diagnosed  No lightheadedness or dizziness  Does not take any anticoagulants or antiplatelets  No diarrhea no constipation  She has abdominal pain but thinks it is her chronic pain from her endometriosis  Denies fevers chills nausea vomiting  Occasionally has epigastric pain  Has not seen a GI physician for this  No pain with bowel movements  History provided by:  Patient   used: No    Rectal Bleeding - Minor   Quality:  Bright red  Amount: Moderate  Duration:  6 days  Timing:  Constant  Chronicity:  New  Context: hemorrhoids    Context: not anal fissures, not anal penetration, not constipation, not defecation, not diarrhea, not foreign body, not rectal injury, not rectal pain and not spontaneously    Similar prior episodes: no    Relieved by:  Nothing  Worsened by:  Nothing  Ineffective treatments:  None tried  Associated symptoms: abdominal pain    Associated symptoms: no dizziness, no epistaxis, no fever, no hematemesis, no light-headedness, no loss of consciousness, no recent illness and no vomiting    Risk factors: NSAID use    Risk factors: no anticoagulant use, no hx of colorectal cancer, no hx of colorectal surgery, no hx of IBD, no liver disease and no steroid use        Prior to Admission Medications   Prescriptions Last Dose Informant Patient Reported? Taking?    Fluoxetine HCl, PMDD, 20 MG CAPS  Self No No   Sig: Take 1 capsule (20 mg total) by mouth daily   Probiotic Product (PROBIOTIC-10 PO)  Self Yes No   Sig: Take by mouth   ibuprofen (MOTRIN) 200 mg tablet  Self Yes No   Sig: Take by mouth every 6 (six) hours as needed for mild pain   ibuprofen (MOTRIN) 800 mg tablet   No No   Sig: Take 1 tablet (800 mg total) by mouth every 6 (six) hours as needed for moderate pain   naproxen (NAPROSYN) 250 mg tablet  Self Yes No   Sig: Take 250 mg by mouth 2 (two) times a day with meals      Facility-Administered Medications: None       Past Medical History:   Diagnosis Date    Anxiety     Depression     Endometriosis     Enlarged ovary        Past Surgical History:   Procedure Laterality Date    NO PAST SURGERIES         Family History   Problem Relation Age of Onset    Hypothyroidism Mother    [place] Migraines Sister     Heart disease Maternal Grandfather     Lung cancer Paternal Grandfather     Diabetes Sister     Hypothyroidism Sister     Breast cancer Neg Hx     Ovarian cancer Neg Hx     Uterine cancer Neg Hx     Cervical cancer Neg Hx      I have reviewed and agree with the history as documented  Social History   Substance Use Topics    Smoking status: Current Every Day Smoker     Packs/day: 1 00     Types: Cigarettes    Smokeless tobacco: Never Used    Alcohol use Yes      Comment: occasional        Review of Systems   Constitutional: Negative for activity change, appetite change, chills, diaphoresis, fatigue, fever and unexpected weight change  HENT: Negative for congestion, nosebleeds, rhinorrhea, sinus pressure, sore throat and trouble swallowing  Eyes: Negative for photophobia and visual disturbance  Respiratory: Negative for apnea, cough, choking, chest tightness, shortness of breath, wheezing and stridor  Cardiovascular: Negative for chest pain, palpitations and leg swelling  Gastrointestinal: Positive for abdominal pain, anal bleeding and hematochezia  Negative for abdominal distention, blood in stool, constipation, diarrhea, hematemesis, nausea, rectal pain and vomiting     Genitourinary: Negative for decreased urine volume, difficulty urinating, dysuria, enuresis, flank pain, frequency, hematuria and urgency  Musculoskeletal: Negative for arthralgias, myalgias, neck pain and neck stiffness  Skin: Negative for color change, pallor, rash and wound  Allergic/Immunologic: Negative  Neurological: Negative for dizziness, tremors, loss of consciousness, syncope, weakness, light-headedness, numbness and headaches  Hematological: Negative  Psychiatric/Behavioral: Negative  All other systems reviewed and are negative  Physical Exam  Physical Exam   Constitutional: She is oriented to person, place, and time  She appears well-developed and well-nourished  Non-toxic appearance  She does not have a sickly appearance  She does not appear ill  No distress  HENT:   Head: Normocephalic and atraumatic  Eyes: EOM and lids are normal  Pupils are equal, round, and reactive to light  Neck: Normal range of motion  Neck supple  Cardiovascular: Normal rate, regular rhythm, S1 normal, S2 normal, normal heart sounds, intact distal pulses and normal pulses  Exam reveals no gallop, no distant heart sounds, no friction rub and no decreased pulses  No murmur heard  Pulses:       Radial pulses are 2+ on the right side, and 2+ on the left side  Pulmonary/Chest: Effort normal and breath sounds normal  No accessory muscle usage  No apnea, no tachypnea and no bradypnea  No respiratory distress  She has no decreased breath sounds  She has no wheezes  She has no rhonchi  She has no rales  Abdominal: Soft  Normal appearance  She exhibits no distension  There is tenderness in the suprapubic area  There is no rigidity, no rebound and no guarding  Genitourinary: Rectal exam shows external hemorrhoid and guaiac positive stool  Musculoskeletal: Normal range of motion  She exhibits no edema, tenderness or deformity  Neurological: She is alert and oriented to person, place, and time  No cranial nerve deficit  GCS eye subscore is 4  GCS verbal subscore is 5   GCS motor subscore is 6    GCS 15  AAOx3  Ambulating in department without difficulty  CN II-XII grossly intact  No focal neuro deficits  Skin: Skin is warm, dry and intact  No rash noted  She is not diaphoretic  No erythema  No pallor  Psychiatric: Her speech is normal    Nursing note and vitals reviewed  Vital Signs  ED Triage Vitals   Temperature Pulse Respirations Blood Pressure SpO2   08/10/18 1441 08/10/18 1441 08/10/18 1441 08/10/18 1441 08/10/18 1441   98 2 °F (36 8 °C) 90 18 141/82 100 %      Temp Source Heart Rate Source Patient Position - Orthostatic VS BP Location FiO2 (%)   08/10/18 1441 08/10/18 1441 08/10/18 1746 08/10/18 1746 --   Oral Monitor Lying Left arm       Pain Score       --                  Vitals:    08/10/18 1441 08/10/18 1746   BP: 141/82 119/66   Pulse: 90 60   Patient Position - Orthostatic VS:  Lying       Visual Acuity      ED Medications  Medications   sodium chloride 0 9 % bolus 1,000 mL (0 mL Intravenous Stopped 8/10/18 1715)   iohexol (OMNIPAQUE) 350 MG/ML injection (MULTI-DOSE) 100 mL (100 mL Intravenous Given 8/10/18 1723)       Diagnostic Studies  Results Reviewed     Procedure Component Value Units Date/Time    Basic metabolic panel [[id number]]  (Abnormal) Collected:  08/10/18 1617    Lab Status:  Final result Specimen:  Blood from Arm, Right Updated:  08/10/18 1646     Sodium 138 mmol/L      Potassium 3 3 (L) mmol/L      Chloride 101 mmol/L      CO2 31 mmol/L      Anion Gap 6 mmol/L      BUN 8 mg/dL      Creatinine 0 54 (L) mg/dL      Glucose 92 mg/dL      Calcium 8 7 mg/dL      eGFR 125 ml/min/1 73sq m     Narrative:         National Kidney Disease Education Program recommendations are as follows:  GFR calculation is accurate only with a steady state creatinine  Chronic Kidney disease less than 60 ml/min/1 73 sq  meters  Kidney failure less than 15 ml/min/1 73 sq  meters      Hepatic function panel [[id number]]  (Normal) Collected:  08/10/18 1617    Lab Status:  Final result Specimen: Blood from Arm, Right Updated:  08/10/18 1646     Total Bilirubin 0 40 mg/dL      Bilirubin, Direct 0 11 mg/dL      Alkaline Phosphatase 99 U/L      AST 20 U/L      ALT 20 U/L      Total Protein 7 7 g/dL      Albumin 4 0 g/dL     Lipase [[id number]]  (Normal) Collected:  08/10/18 1617    Lab Status:  Final result Specimen:  Blood from Arm, Right Updated:  08/10/18 1646     Lipase 136 u/L     Urine Microscopic [[id number]]  (Abnormal) Collected:  08/10/18 1619    Lab Status:  Final result Specimen:  Urine from Urine, Clean Catch Updated:  08/10/18 1643     RBC, UA 0-1 (A) /hpf      WBC, UA None Seen /hpf      Epithelial Cells Occasional /hpf      Bacteria, UA Occasional /hpf     Protime-INR [[id number]]  (Normal) Collected:  08/10/18 1617    Lab Status:  Final result Specimen:  Blood from Arm, Right Updated:  08/10/18 1640     Protime 13 2 seconds      INR 1 01    APTT [[id number]]  (Normal) Collected:  08/10/18 1617    Lab Status:  Final result Specimen:  Blood from Arm, Right Updated:  08/10/18 1640     PTT 29 seconds     UA w Reflex to Microscopic w Reflex to Culture [[id number]]  (Abnormal) Collected:  08/10/18 1619    Lab Status:  Final result Specimen:  Urine from Urine, Clean Catch Updated:  08/10/18 1633     Color, UA Light Yellow     Clarity, UA Clear     Specific Gravity, UA 1 010     pH, UA 6 5     Leukocytes, UA Negative     Nitrite, UA Negative     Protein, UA Negative mg/dl      Glucose, UA Negative mg/dl      Ketones, UA Negative mg/dl      Urobilinogen, UA 0 2 E U /dl      Bilirubin, UA Negative     Blood, UA Small (A)    POCT pregnancy, urine [[id number]]  (Normal) Resulted:  08/10/18 1623    Lab Status:  Final result Updated:  08/10/18 1623     EXT PREG TEST UR (Ref: Negative) negative    CBC and differential [[id number]]  (Abnormal) Collected:  08/10/18 1617    Lab Status:  Final result Specimen:  Blood from Arm, Right Updated:  08/10/18 1623     WBC 6 72 Thousand/uL      RBC 4 07 Million/uL      Hemoglobin 13 6 g/dL      Hematocrit 39 7 %      MCV 98 fL      MCH 33 4 pg      MCHC 34 3 g/dL      RDW 14 2 %      MPV 8 6 (L) fL      Platelets 830 Thousands/uL      nRBC 0 /100 WBCs      Neutrophils Relative 59 %      Immat GRANS % 0 %      Lymphocytes Relative 29 %      Monocytes Relative 10 %      Eosinophils Relative 1 %      Basophils Relative 1 %      Neutrophils Absolute 4 00 Thousands/µL      Immature Grans Absolute 0 02 Thousand/uL      Lymphocytes Absolute 1 92 Thousands/µL      Monocytes Absolute 0 69 Thousand/µL      Eosinophils Absolute 0 05 Thousand/µL      Basophils Absolute 0 04 Thousands/µL                  CT abdomen pelvis with contrast   Final Result by Albin Johnson MD (08/10 1738)      No acute findings  Known left adnexal endometrioma  Workstation performed: [id number]                    Procedures  Procedures       Phone Contacts  ED Phone Contact    ED Course                               MDM  Number of Diagnoses or Management Options  Rectal bleeding: new and requires workup  Diagnosis management comments: Differential diagnosis including but not limited to: upper GI bleed, gastritis, PUD, diverticulosis, hemorrhoids, anemia, coagulopathy, liver disease, tumor, anal fissure  Plan: labs, CT given her pain with this as well  Fluids  dispo pending  Amount and/or Complexity of Data Reviewed  Clinical lab tests: ordered and reviewed  Tests in the radiology section of CPT®: ordered and reviewed  Independent visualization of images, tracings, or specimens: yes    Risk of Complications, Morbidity, and/or Mortality  Presenting problems: moderate  Management options: low  General comments: 49-year-old female patient here for evaluation of bright red blood per rectum  Only occurs with bowel movements  CT scan is unremarkable  No obvious thrombosed hemorrhoids does have external hemorrhoids on exam   She feels well  She is not acutely anemic  She is not lightheaded or dizzy    Vitals are unremarkable  Will have her follow up with GI as an outpatient  She will return to ER if symptoms worsen  She was most worried about potential worsening of her previous endometriosis/endometrioma  No obvious changes on CT scan here  She understands the need for follow-up  Patient Progress  Patient progress: stable    CritCare Time    Disposition  Final diagnoses:   Rectal bleeding     Time reflects when diagnosis was documented in both MDM as applicable and the Disposition within this note     Time User Action Codes Description Comment    8/10/2018  5:44 PM Michael Thomas Add [K62 [id number]] Rectal bleeding       ED Disposition     ED Disposition Condition Comment    Discharge  Yoni Bad discharge to home/self care  Condition at discharge: Good        Follow-up Information     Follow up With Specialties Details Why Megan Ramos MD Gastroenterology Call for GI evaluation of your bleeding [id number] [address]  [phone]            Discharge Medication List as of 8/10/2018  5:45 PM      CONTINUE these medications which have NOT CHANGED    Details   Fluoxetine HCl, PMDD, 20 MG CAPS Take 1 capsule (20 mg total) by mouth daily, Starting Wed 4/4/2018, Normal      !! ibuprofen (MOTRIN) 200 mg tablet Take by mouth every 6 (six) hours as needed for mild pain, Historical Med      !! ibuprofen (MOTRIN) 800 mg tablet Take 1 tablet (800 mg total) by mouth every 6 (six) hours as needed for moderate pain, Starting Tue 6/5/2018, Normal      naproxen (NAPROSYN) 250 mg tablet Take 250 mg by mouth 2 (two) times a day with meals, Historical Med      Probiotic Product (PROBIOTIC-10 PO) Take by mouth, Historical Med       !! - Potential duplicate medications found  Please discuss with provider  No discharge procedures on file      ED Provider  Electronically Signed by           Ely Sherman PA-C  08/10/18 2005

## 2018-08-10 NOTE — TELEPHONE ENCOUNTER
Spoke with pt  Who had recent colpo and u/s    For the past week has been having bright red bleeding with bm's    Cycle due 08/13    She has experienced some drk brn disch vaginally    Adv to contack pcp and be seen due to duration of rectal bleeding    She will keep a calendar re vaginal disch     Awaiting cycle  colpo was done 06/2018

## 2018-08-10 NOTE — DISCHARGE INSTRUCTIONS
Return to the Emergency Department sooner if increased pain, fever, vomiting, diarrhea, difficulty breathing or urinating, bleeding  Clear fluids for 1-2 days and then advance diet as tolerated  Rectal Bleeding   WHAT YOU NEED TO KNOW:   Rectal bleeding can be caused by constipation, hemorrhoids, or anal fissures  It may also be caused by polyps, tumors, or medical conditions, such as colitis or diverticulitis  DISCHARGE INSTRUCTIONS:   Medicines:   · Pain medicine: You may be given medicine to take away or decrease pain  Do not wait until the pain is severe before you take your medicine  · Iron supplement:  Iron helps your body make more red blood cells  · Steroids: This medicine decreases inflammation in your rectum  It may be applied as a cream, ointment, or lotion  · Take your medicine as directed  Contact your healthcare provider if you think your medicine is not helping or if you have side effects  Tell him of her if you are allergic to any medicine  Keep a list of the medicines, vitamins, and herbs you take  Include the amounts, and when and why you take them  Bring the list or the pill bottles to follow-up visits  Carry your medicine list with you in case of an emergency  Follow up with your healthcare provider as directed:  Write down your questions so you remember to ask them during your visits  Drink liquids as directed:  Ask your healthcare provider how much liquid to drink each day and which liquids are best for you  This will help prevent dehydration and constipation  Contact your healthcare provider if:   · You have a fever  · Your rectal bleeding stopped for a time, but has started again  · You have nausea  · You have cold, sweaty, pale skin  · You have changes in your bowel movements, such as diarrhea  · You have questions or concerns about your condition or care    Return to the emergency department if:   · You are breathing faster than usual     · You are dizzy, lightheaded, or feel faint  · You are confused or cannot think clearly  · You urinate less than usual or not at all  · Your rectal bleeding is constant or heavy  · You have severe abdominal pain or cramping  © 2017 [employer] Information is for End User's use only and may not be sold, redistributed or otherwise used for commercial purposes  All illustrations and images included in CareNotes® are the copyrighted property of [employer]  or [employer]  The above information is an  only  It is not intended as medical advice for individual conditions or treatments  Talk to your doctor, nurse or pharmacist before following any medical regimen to see if it is safe and effective for you

## 2018-08-13 ENCOUNTER — OFFICE VISIT (OUTPATIENT)
Dept: INTERNAL MEDICINE CLINIC | Facility: CLINIC | Age: 33
End: 2018-08-13
Payer: COMMERCIAL

## 2018-08-13 VITALS
HEIGHT: 64 IN | SYSTOLIC BLOOD PRESSURE: 122 MMHG | DIASTOLIC BLOOD PRESSURE: 62 MMHG | OXYGEN SATURATION: 99 % | BODY MASS INDEX: 21.34 KG/M2 | WEIGHT: 125 LBS | HEART RATE: 71 BPM

## 2018-08-13 DIAGNOSIS — K62.5 RECTAL BLEEDING: Primary | ICD-10-CM

## 2018-08-13 DIAGNOSIS — E55.9 VITAMIN D DEFICIENCY: ICD-10-CM

## 2018-08-13 DIAGNOSIS — F33.9 EPISODE OF RECURRENT MAJOR DEPRESSIVE DISORDER, UNSPECIFIED DEPRESSION EPISODE SEVERITY (HCC): ICD-10-CM

## 2018-08-13 PROCEDURE — 99213 OFFICE O/P EST LOW 20 MIN: CPT | Performed by: PHYSICIAN ASSISTANT

## 2018-08-13 RX ORDER — FLUOXETINE HYDROCHLORIDE 40 MG/1
40 CAPSULE ORAL DAILY
Qty: 30 CAPSULE | Refills: 4 | Status: SHIPPED | OUTPATIENT
Start: 2018-08-13 | End: 2018-12-10 | Stop reason: ALTCHOICE

## 2018-08-13 NOTE — PATIENT INSTRUCTIONS
At this point will increase the fluoxetine to 40 mg daily  Follow-up with GI as scheduled on Friday  Remember to tell GI also about reflux symptoms so that an EGD could also be done  Start vitamin-D3 5000 units daily

## 2018-08-13 NOTE — PROGRESS NOTES
Assessment/Plan:   Patient Instructions   At this point will increase the fluoxetine to 40 mg daily  Follow-up with GI as scheduled on Friday  Remember to tell GI also about reflux symptoms so that an EGD could also be done  Start vitamin-D3 5000 units daily  Return in about 4 months (around 12/13/2018) for Next scheduled follow up  Diagnoses and all orders for this visit:    Rectal bleeding    Episode of recurrent major depressive disorder, unspecified depression episode severity (HCC)  -     FLUoxetine (PROzac) 40 MG capsule; Take 1 capsule (40 mg total) by mouth daily    Vitamin D deficiency  -     Vitamin D 25 hydroxy; Future          Subjective:      Patient ID: Ghada Schneider is a 28 y o  female  ER follow-up    Patient states for the past 2 weeks she has noted bleeding in the toilet water, and on the toilet paper and she believes mixed in the stool every time she has moved her bowels  She is known to have an external hemorrhoid  Did not note any constipation or hard stool prior to the onset of the symptoms  She had gone to the emergency room on 08/10/2018 where the workup was essentially unremarkable, she was referred to GI and has appointment scheduled for Friday  I asked her also to speak to GI about her ongoing GERD symptoms  ER labs and imaging studies have been reviewed  We also reviewed labs that she had done back in April  The indicates she is vitamin-D deficient  She did by vitamin D3 5000 units some will start taking 1 daily  Chronic depression on fluoxetine  She is asking that the dose be increased as in her past she was on 40 milligrams daily  Recently she is feeling more stressed due to having gyn issues of positive HPV and continued workup  Patient discussed will to stop smoking at some point, she does not feel she is ready at this point due to her health concerns and stressors which is ironic, because now sweats she should stop    In her past she had been both on the fluoxetine and Wellbutrin, and at that point found it easy to stop smoking  She was asking whether not this could be considered if she is ready to stop  ALLERGIES:  No Known Allergies    CURRENT MEDICATIONS:    Current Outpatient Prescriptions:     Fluoxetine HCl, PMDD, 20 MG CAPS, Take 1 capsule (20 mg total) by mouth daily, Disp: 30 each, Rfl: 5    ibuprofen (MOTRIN) 200 mg tablet, Take by mouth every 6 (six) hours as needed for mild pain, Disp: , Rfl:     Probiotic Product (PROBIOTIC-10 PO), Take by mouth, Disp: , Rfl:     FLUoxetine (PROzac) 40 MG capsule, Take 1 capsule (40 mg total) by mouth daily, Disp: 30 capsule, Rfl: 4    ACTIVE PROBLEM LIST:  Patient Active Problem List   Diagnosis    Anxiety disorder    Intractable chronic migraine without aura    Episode of recurrent major depressive disorder (HCC)    Gastroesophageal reflux disease    Encounter for gynecological examination (general) (routine) with abnormal findings    Pelvic pain    Carcinoma in situ of exocervix    Cervical high risk HPV (human papillomavirus) test positive    Rectal bleeding    Vitamin D deficiency       PAST MEDICAL HISTORY:  Past Medical History:   Diagnosis Date    Anxiety     Depression     Endometriosis     Enlarged ovary        PAST SURGICAL HISTORY:  Past Surgical History:   Procedure Laterality Date    NO PAST SURGERIES         FAMILY HISTORY:  Family History   Problem Relation Age of Onset    Hypothyroidism Mother     Migraines Sister     Heart disease Maternal Grandfather     Lung cancer Paternal Grandfather     Diabetes Sister     Hypothyroidism Sister     Breast cancer Neg Hx     Ovarian cancer Neg Hx     Uterine cancer Neg Hx     Cervical cancer Neg Hx        SOCIAL HISTORY:  Social History     Social History    Marital status: Single     Spouse name: N/A    Number of children: N/A    Years of education: N/A     Occupational History    Not on file       Social History Main Topics    Smoking status: Current Every Day Smoker     Packs/day: 1 00     Types: Cigarettes    Smokeless tobacco: Never Used    Alcohol use Yes      Comment: occasional    Drug use: No    Sexual activity: Yes     Partners: Male     Birth control/ protection: None     Other Topics Concern    Not on file     Social History Narrative    Reg dental care    Brushes teeth regularly    Hobbies-crafts, dogs       Review of Systems   Constitutional: Negative for activity change, chills, fatigue and fever  HENT: Negative for congestion  Eyes: Negative for discharge  Respiratory: Negative for cough, chest tightness and shortness of breath  Cardiovascular: Negative for chest pain, palpitations and leg swelling  Gastrointestinal: Positive for anal bleeding and blood in stool  Negative for abdominal pain and rectal pain  Genitourinary: Negative for difficulty urinating, dysuria, frequency and urgency  Musculoskeletal: Negative for arthralgias and myalgias  Skin: Negative for rash  Allergic/Immunologic: Negative for immunocompromised state  Neurological: Negative for dizziness, syncope, weakness, light-headedness and headaches  Hematological: Negative for adenopathy  Does not bruise/bleed easily  Psychiatric/Behavioral: Positive for dysphoric mood  The patient is not nervous/anxious  Objective:  Vitals:    08/13/18 1351   BP: 122/62   BP Location: Left arm   Patient Position: Sitting   Pulse: 71   SpO2: 99%   Weight: 56 7 kg (125 lb)   Height: 5' 4" (1 626 m)        Physical Exam   Constitutional: She is oriented to person, place, and time  She appears well-developed and well-nourished  No distress  Neck: Neck supple  No JVD present  Carotid bruit is not present  Cardiovascular: Normal rate, regular rhythm and normal heart sounds  Pulmonary/Chest: Effort normal and breath sounds normal    Abdominal: Soft  Bowel sounds are normal  There is no tenderness  Musculoskeletal: She exhibits no edema  Lymphadenopathy:     She has no cervical adenopathy  Neurological: She is alert and oriented to person, place, and time  Skin: Skin is warm and dry  No rash noted  Psychiatric: She has a normal mood and affect  Her behavior is normal    Nursing note and vitals reviewed          RESULTS:    Recent Results (from the past 1008 hour(s))   CBC and differential    Collection Time: 08/10/18  4:17 PM   Result Value Ref Range    WBC 6 72 4 31 - 10 16 Thousand/uL    RBC 4 07 3 81 - 5 12 Million/uL    Hemoglobin 13 6 11 5 - 15 4 g/dL    Hematocrit 39 7 34 8 - 46 1 %    MCV 98 82 - 98 fL    MCH 33 4 26 8 - 34 3 pg    MCHC 34 3 31 4 - 37 4 g/dL    RDW 14 2 11 6 - 15 1 %    MPV 8 6 (L) 8 9 - 12 7 fL    Platelets 041 449 - 911 Thousands/uL    nRBC 0 /100 WBCs    Neutrophils Relative 59 43 - 75 %    Immat GRANS % 0 0 - 2 %    Lymphocytes Relative 29 14 - 44 %    Monocytes Relative 10 4 - 12 %    Eosinophils Relative 1 0 - 6 %    Basophils Relative 1 0 - 1 %    Neutrophils Absolute 4 00 1 85 - 7 62 Thousands/µL    Immature Grans Absolute 0 02 0 00 - 0 20 Thousand/uL    Lymphocytes Absolute 1 92 0 60 - 4 47 Thousands/µL    Monocytes Absolute 0 69 0 17 - 1 22 Thousand/µL    Eosinophils Absolute 0 05 0 00 - 0 61 Thousand/µL    Basophils Absolute 0 04 0 00 - 0 10 Thousands/µL   Basic metabolic panel    Collection Time: 08/10/18  4:17 PM   Result Value Ref Range    Sodium 138 136 - 145 mmol/L    Potassium 3 3 (L) 3 5 - 5 3 mmol/L    Chloride 101 100 - 108 mmol/L    CO2 31 21 - 32 mmol/L    Anion Gap 6 4 - 13 mmol/L    BUN 8 5 - 25 mg/dL    Creatinine 0 54 (L) 0 60 - 1 30 mg/dL    Glucose 92 65 - 140 mg/dL    Calcium 8 7 8 3 - 10 1 mg/dL    eGFR 125 ml/min/1 73sq m   Hepatic function panel    Collection Time: 08/10/18  4:17 PM   Result Value Ref Range    Total Bilirubin 0 40 0 20 - 1 00 mg/dL    Bilirubin, Direct 0 11 0 00 - 0 20 mg/dL    Alkaline Phosphatase 99 46 - 116 U/L    AST 20 5 - 45 U/L    ALT 20 12 - 78 U/L    Total Protein 7 7 6 4 - 8 2 g/dL    Albumin 4 0 3 5 - 5 0 g/dL   Lipase    Collection Time: 08/10/18  4:17 PM   Result Value Ref Range    Lipase 136 73 - 393 u/L   Protime-INR    Collection Time: 08/10/18  4:17 PM   Result Value Ref Range    Protime 13 2 11 8 - 14 2 seconds    INR 1 01 0 86 - 1 17   APTT    Collection Time: 08/10/18  4:17 PM   Result Value Ref Range    PTT 29 24 - 36 seconds   UA w Reflex to Microscopic w Reflex to Culture    Collection Time: 08/10/18  4:19 PM   Result Value Ref Range    Color, UA Light Yellow     Clarity, UA Clear     Specific Prosperity, UA 1 010 1 003 - 1 030    pH, UA 6 5 4 5 - 8 0    Leukocytes, UA Negative Negative    Nitrite, UA Negative Negative    Protein, UA Negative Negative mg/dl    Glucose, UA Negative Negative mg/dl    Ketones, UA Negative Negative mg/dl    Urobilinogen, UA 0 2 0 2, 1 0 E U /dl E U /dl    Bilirubin, UA Negative Negative    Blood, UA Small (A) Negative   Urine Microscopic    Collection Time: 08/10/18  4:19 PM   Result Value Ref Range    RBC, UA 0-1 (A) None Seen, 0-5 /hpf    WBC, UA None Seen None Seen, 0-5, 5-55, 5-65 /hpf    Epithelial Cells Occasional None Seen, Occasional /hpf    Bacteria, UA Occasional None Seen, Occasional /hpf   POCT pregnancy, urine    Collection Time: 08/10/18  4:23 PM   Result Value Ref Range    EXT PREG TEST UR (Ref: Negative) negative        This note was created with voice recognition software  Phonic, grammatical and spelling errors may be present within the note as a result

## 2018-08-17 ENCOUNTER — OFFICE VISIT (OUTPATIENT)
Dept: GASTROENTEROLOGY | Facility: CLINIC | Age: 33
End: 2018-08-17
Payer: COMMERCIAL

## 2018-08-17 VITALS
WEIGHT: 124 LBS | SYSTOLIC BLOOD PRESSURE: 120 MMHG | DIASTOLIC BLOOD PRESSURE: 60 MMHG | BODY MASS INDEX: 21.17 KG/M2 | HEIGHT: 64 IN | HEART RATE: 77 BPM

## 2018-08-17 DIAGNOSIS — R10.13 DYSPEPSIA: ICD-10-CM

## 2018-08-17 DIAGNOSIS — K64.4 EXTERNAL HEMORRHOID: ICD-10-CM

## 2018-08-17 DIAGNOSIS — K21.9 GASTROESOPHAGEAL REFLUX DISEASE, ESOPHAGITIS PRESENCE NOT SPECIFIED: Primary | ICD-10-CM

## 2018-08-17 DIAGNOSIS — K62.5 RECTAL BLEEDING: ICD-10-CM

## 2018-08-17 DIAGNOSIS — R10.13 EPIGASTRIC PAIN: ICD-10-CM

## 2018-08-17 PROCEDURE — 99204 OFFICE O/P NEW MOD 45 MIN: CPT | Performed by: NURSE PRACTITIONER

## 2018-08-17 NOTE — PROGRESS NOTES
Assessment/Plan:    EGD and colonoscopy  Hydrocortisone cream  Patient would like to wake for any medications for her dyspepsia until after the endoscopy     Diagnoses and all orders for this visit:    Gastroesophageal reflux disease, esophagitis presence not specified    Rectal bleeding  -     hydrocortisone (ANUSOL-HC, PROCTOSOL HC,) 2 5 % rectal cream; Insert into the rectum 2 (two) times a day    Epigastric pain    External hemorrhoid  -     hydrocortisone (ANUSOL-HC, PROCTOSOL HC,) 2 5 % rectal cream; Insert into the rectum 2 (two) times a day    Dyspepsia    Other orders  -     Loratadine (CLARITIN PO); Take by mouth  -     Digestive Enzymes (ENZYME DIGEST PO); Take by mouth    @ASSESSMENTEN  D@     Subjective:      Patient ID: Madelin Abraham is a 28 y o  female  A 35-year-old female with past medical history of endometriosis and is here for rectal bleeding and external hemorrhoid  She reports noticing blood mixed in with her stool over the past three weeks  She only notices rectal bleeding with bowel movements and denies constipation or diarrhea  She has some mild lower quadrant abdominal pain that is relieved by bowel movement  She denies melena, constipation, diarrhea  She also reports a multiyear history of intermittent dyspeptic symptoms and has been on H2 use and PPIs in the past and is currently taking an herbal remedy that is working for her epigastric pain, nausea, bloating and belching  She has never had an endoscopy or colonoscopy before  She has no unintentional weight loss fever fatigue the        The following portions of the patient's history were reviewed and updated as appropriate: She  has a past medical history of Anxiety; Depression; Endometriosis; and Enlarged ovary    She   Patient Active Problem List    Diagnosis Date Noted    Dyspepsia 08/17/2018    BRBPR (bright red blood per rectum) 08/17/2018    Epigastric pain 08/17/2018    External hemorrhoid 08/17/2018    Rectal bleeding 08/13/2018    Vitamin D deficiency 08/13/2018    Carcinoma in situ of exocervix 05/22/2018    Cervical high risk HPV (human papillomavirus) test positive 05/22/2018    Encounter for gynecological examination (general) (routine) with abnormal findings 05/04/2018    Pelvic pain 05/04/2018    Episode of recurrent major depressive disorder ([facility] [address] ) 04/04/2018    Gastroesophageal reflux disease 04/04/2018    Intractable chronic migraine without aura 01/09/2015    Anxiety disorder 08/08/2014     She  has a past surgical history that includes No past surgeries  Her family history includes Diabetes in her sister; Heart disease in her maternal grandfather; Hypothyroidism in her mother and sister; Lung cancer in her paternal grandfather; Migraines in her sister  She  reports that she has been smoking Cigarettes  She has been smoking about 1 00 pack per day  She has never used smokeless tobacco  She reports that she drinks alcohol  She reports that she does not use drugs  Current Outpatient Prescriptions   Medication Sig Dispense Refill    Digestive Enzymes (ENZYME DIGEST PO) Take by mouth      FLUoxetine (PROzac) 40 MG capsule Take 1 capsule (40 mg total) by mouth daily 30 capsule 4    ibuprofen (MOTRIN) 200 mg tablet Take by mouth every 6 (six) hours as needed for mild pain      Loratadine (CLARITIN PO) Take by mouth      Probiotic Product (PROBIOTIC-10 PO) Take by mouth      hydrocortisone (ANUSOL-HC, PROCTOSOL HC,) 2 5 % rectal cream Insert into the rectum 2 (two) times a day 30 g 0     No current facility-administered medications for this visit        Current Outpatient Prescriptions on File Prior to Visit   Medication Sig    FLUoxetine (PROzac) 40 MG capsule Take 1 capsule (40 mg total) by mouth daily    ibuprofen (MOTRIN) 200 mg tablet Take by mouth every 6 (six) hours as needed for mild pain    Probiotic Product (PROBIOTIC-10 PO) Take by mouth    [DISCONTINUED] Fluoxetine HCl, PMDD, 20 MG CAPS Take 1 capsule (20 mg total) by mouth daily (Patient not taking: Reported on 8/17/2018 )     No current facility-administered medications on file prior to visit  She has No Known Allergies       Review of Systems   Constitutional: Negative  HENT: Negative  Eyes: Negative  Respiratory: Negative  Cardiovascular: Negative  Gastrointestinal: Positive for anal bleeding  Endocrine: Negative  Musculoskeletal: Negative  Objective:      /60   Pulse 77   Ht 5' 4" (1 626 m)   Wt 56 2 kg (124 lb)   LMP 07/23/2018 (Approximate)   BMI 21 28 kg/m²          Physical Exam   Constitutional: She is oriented to person, place, and time  She appears well-developed and well-nourished  Eyes: No scleral icterus  Cardiovascular: Normal rate and regular rhythm  Pulmonary/Chest: Effort normal and breath sounds normal    Abdominal: Soft  Bowel sounds are normal    Genitourinary:   Genitourinary Comments: Large nonbleeding external hemorrhoid seen   Lymphadenopathy:     She has no cervical adenopathy  Neurological: She is alert and oriented to person, place, and time  Skin: Skin is warm and dry  Psychiatric: She has a normal mood and affect

## 2018-08-20 ENCOUNTER — ANESTHESIA EVENT (OUTPATIENT)
Dept: PERIOP | Facility: HOSPITAL | Age: 33
End: 2018-08-20

## 2018-08-20 ENCOUNTER — ANESTHESIA (OUTPATIENT)
Dept: PERIOP | Facility: HOSPITAL | Age: 33
End: 2018-08-20

## 2018-08-20 ENCOUNTER — TELEPHONE (OUTPATIENT)
Dept: GASTROENTEROLOGY | Facility: CLINIC | Age: 33
End: 2018-08-20

## 2018-08-20 ENCOUNTER — HOSPITAL ENCOUNTER (OUTPATIENT)
Facility: HOSPITAL | Age: 33
Setting detail: OUTPATIENT SURGERY
Discharge: HOME | End: 2018-08-20
Attending: INTERNAL MEDICINE | Admitting: INTERNAL MEDICINE
Payer: COMMERCIAL

## 2018-08-20 VITALS
WEIGHT: 123.24 LBS | RESPIRATION RATE: 14 BRPM | DIASTOLIC BLOOD PRESSURE: 60 MMHG | BODY MASS INDEX: 21.04 KG/M2 | HEIGHT: 64 IN | OXYGEN SATURATION: 98 % | HEART RATE: 63 BPM | SYSTOLIC BLOOD PRESSURE: 107 MMHG | TEMPERATURE: 98.4 F

## 2018-08-20 PROBLEM — R10.13 ABDOMINAL PAIN, EPIGASTRIC: Status: ACTIVE | Noted: 2018-08-20

## 2018-08-20 LAB — EXT PREGNANCY TEST URINE: NEGATIVE

## 2018-08-20 PROCEDURE — 81025 URINE PREGNANCY TEST: CPT | Performed by: ANESTHESIOLOGY

## 2018-08-20 RX ORDER — CETIRIZINE HYDROCHLORIDE 10 MG/1
10 TABLET ORAL DAILY
COMMUNITY

## 2018-08-20 RX ORDER — SODIUM CHLORIDE, SODIUM LACTATE, POTASSIUM CHLORIDE, CALCIUM CHLORIDE 600; 310; 30; 20 MG/100ML; MG/100ML; MG/100ML; MG/100ML
125 INJECTION, SOLUTION INTRAVENOUS CONTINUOUS
Status: DISCONTINUED | OUTPATIENT
Start: 2018-08-20 | End: 2018-08-20 | Stop reason: HOSPADM

## 2018-08-22 ENCOUNTER — ANESTHESIA EVENT (OUTPATIENT)
Dept: PERIOP | Facility: HOSPITAL | Age: 33
End: 2018-08-22
Payer: COMMERCIAL

## 2018-08-23 ENCOUNTER — HOSPITAL ENCOUNTER (OUTPATIENT)
Facility: HOSPITAL | Age: 33
Setting detail: OUTPATIENT SURGERY
Discharge: HOME | End: 2018-08-23
Attending: INTERNAL MEDICINE | Admitting: INTERNAL MEDICINE
Payer: COMMERCIAL

## 2018-08-23 ENCOUNTER — ANESTHESIA (OUTPATIENT)
Dept: PERIOP | Facility: HOSPITAL | Age: 33
End: 2018-08-23
Payer: COMMERCIAL

## 2018-08-23 VITALS
TEMPERATURE: 97.6 F | RESPIRATION RATE: 20 BRPM | HEART RATE: 69 BPM | SYSTOLIC BLOOD PRESSURE: 111 MMHG | BODY MASS INDEX: 19.91 KG/M2 | WEIGHT: 119.49 LBS | DIASTOLIC BLOOD PRESSURE: 81 MMHG | HEIGHT: 65 IN | OXYGEN SATURATION: 99 %

## 2018-08-23 DIAGNOSIS — K62.5 BRBPR (BRIGHT RED BLOOD PER RECTUM): ICD-10-CM

## 2018-08-23 DIAGNOSIS — R10.13 ABDOMINAL PAIN, EPIGASTRIC: ICD-10-CM

## 2018-08-23 PROCEDURE — 88305 TISSUE EXAM BY PATHOLOGIST: CPT | Performed by: PATHOLOGY

## 2018-08-23 PROCEDURE — 88342 IMHCHEM/IMCYTCHM 1ST ANTB: CPT | Performed by: PATHOLOGY

## 2018-08-23 PROCEDURE — 45384 COLONOSCOPY W/LESION REMOVAL: CPT | Performed by: INTERNAL MEDICINE

## 2018-08-23 PROCEDURE — 43239 EGD BIOPSY SINGLE/MULTIPLE: CPT | Performed by: INTERNAL MEDICINE

## 2018-08-23 RX ORDER — PROPOFOL 10 MG/ML
INJECTION, EMULSION INTRAVENOUS AS NEEDED
Status: DISCONTINUED | OUTPATIENT
Start: 2018-08-23 | End: 2018-08-23 | Stop reason: SURG

## 2018-08-23 RX ORDER — SODIUM CHLORIDE, SODIUM LACTATE, POTASSIUM CHLORIDE, CALCIUM CHLORIDE 600; 310; 30; 20 MG/100ML; MG/100ML; MG/100ML; MG/100ML
INJECTION, SOLUTION INTRAVENOUS CONTINUOUS PRN
Status: DISCONTINUED | OUTPATIENT
Start: 2018-08-23 | End: 2018-08-23 | Stop reason: SURG

## 2018-08-23 RX ORDER — SODIUM CHLORIDE, SODIUM LACTATE, POTASSIUM CHLORIDE, CALCIUM CHLORIDE 600; 310; 30; 20 MG/100ML; MG/100ML; MG/100ML; MG/100ML
125 INJECTION, SOLUTION INTRAVENOUS CONTINUOUS
Status: DISCONTINUED | OUTPATIENT
Start: 2018-08-23 | End: 2018-08-23 | Stop reason: HOSPADM

## 2018-08-23 RX ADMIN — PROPOFOL 50 MG: 10 INJECTION, EMULSION INTRAVENOUS at 10:17

## 2018-08-23 RX ADMIN — SODIUM CHLORIDE, SODIUM LACTATE, POTASSIUM CHLORIDE, AND CALCIUM CHLORIDE 125 ML/HR: .6; .31; .03; .02 INJECTION, SOLUTION INTRAVENOUS at 09:55

## 2018-08-23 RX ADMIN — LIDOCAINE HYDROCHLORIDE 40 MG: 20 INJECTION, SOLUTION INTRAVENOUS at 10:08

## 2018-08-23 RX ADMIN — PROPOFOL 50 MG: 10 INJECTION, EMULSION INTRAVENOUS at 10:24

## 2018-08-23 RX ADMIN — SODIUM CHLORIDE, SODIUM LACTATE, POTASSIUM CHLORIDE, AND CALCIUM CHLORIDE 125 ML/HR: .6; .31; .03; .02 INJECTION, SOLUTION INTRAVENOUS at 10:37

## 2018-08-23 RX ADMIN — PROPOFOL 50 MG: 10 INJECTION, EMULSION INTRAVENOUS at 10:12

## 2018-08-23 RX ADMIN — SODIUM CHLORIDE, SODIUM LACTATE, POTASSIUM CHLORIDE, AND CALCIUM CHLORIDE: .6; .31; .03; .02 INJECTION, SOLUTION INTRAVENOUS at 10:05

## 2018-08-23 RX ADMIN — PROPOFOL 50 MG: 10 INJECTION, EMULSION INTRAVENOUS at 10:20

## 2018-08-23 RX ADMIN — PROPOFOL 150 MG: 10 INJECTION, EMULSION INTRAVENOUS at 10:10

## 2018-08-23 RX ADMIN — PROPOFOL 50 MG: 10 INJECTION, EMULSION INTRAVENOUS at 10:19

## 2018-08-23 RX ADMIN — PROPOFOL 50 MG: 10 INJECTION, EMULSION INTRAVENOUS at 10:18

## 2018-08-23 RX ADMIN — PROPOFOL 50 MG: 10 INJECTION, EMULSION INTRAVENOUS at 10:16

## 2018-08-23 RX ADMIN — PROPOFOL 20 MG: 10 INJECTION, EMULSION INTRAVENOUS at 10:26

## 2018-08-23 RX ADMIN — PROPOFOL 50 MG: 10 INJECTION, EMULSION INTRAVENOUS at 10:22

## 2018-08-23 RX ADMIN — PROPOFOL 50 MG: 10 INJECTION, EMULSION INTRAVENOUS at 10:14

## 2018-08-23 NOTE — ANESTHESIA PREPROCEDURE EVALUATION
Review of Systems/Medical History  Patient summary reviewed        Cardiovascular  Negative cardio ROS    Pulmonary  Smoker cigarette smoker  , Tobacco cessation counseling given ,        GI/Hepatic    GERD ,        Negative  ROS        Endo/Other  Negative endo/other ROS      GYN  Negative gynecology ROS          Hematology  Negative hematology ROS      Musculoskeletal  Negative musculoskeletal ROS        Neurology    Headaches,    Psychology   Anxiety, Depression ,              Physical Exam    Airway    Mallampati score: I  TM Distance: >3 FB  Neck ROM: full     Dental       Cardiovascular  Comment: Negative ROS, Cardiovascular exam normal    Pulmonary  Pulmonary exam normal     Other Findings        Anesthesia Plan  ASA Score- 2     Anesthesia Type- IV sedation with anesthesia with ASA Monitors  Additional Monitors:   Airway Plan:         Plan Factors-    Induction- intravenous  Postoperative Plan-     Informed Consent- Anesthetic plan and risks discussed with patient  I personally reviewed this patient with the CRNA  Discussed and agreed on the Anesthesia Plan with the CRNA  Zora Bob

## 2018-08-23 NOTE — OP NOTE
Postoperative Note  PATIENT NAME: Patrick Fothergill  : 1985  MRN: [id number]  [facility] ROOM 01    Surgery Date: 2018    POST-OP DIAGNOSIS: See the impression below    SEDATION: Monitored anesthesia care, check anesthesia records    PHYSICAL EXAM:  Vitals:    18 0941   BP: 105/70   Pulse: 65   Resp: 12   Temp: 98 1 °F (36 7 °C)   SpO2: 97%     Body mass index is 20 19 kg/m²  General: NAD  Heart: S1 & S2 normal, RRR  Lungs: CTA, No rales or rhonchi  Abdomen: Soft, nontender, nondistended, good bowel sounds    CONSENT:  Informed consent was obtained for the procedure, including sedation after explaining the risks and benefits of the procedure  Risks including but not limited to bleeding, perforation, infection, aspiration were discussed in detail  Also explained about less than 100%$ sensitivity with the exam and other alternatives  DESCRIPTION:   Procedure:  Esophagogastroduodenoscopy with biopsy    Indications:  49-year-old female with GERD and midepigastric pain    ASA 2    Premedicated with mac anesthesia    Patient is identified by me  She is examined prior to the procedure and found to be in stable condition  She is attached to cardiac monitor and pulse oximeter and placed in left lateral position  After adequate intravenous sedation the Olympus video gastroscope was inserted under direct vision into the esophagus  The esophageal mucosa is completely unremarkable throughout its length with a normal Z-line at 40 cm from the incisors  The stomach is entered  The body and antrum normal  The pylorus is normal   The duodenal bulb exhibits some extremely minimal erythema of uncertain clinical significance  No ulceration is seen  No erosions are noted  This is patchy and only in the distal bulb on 1 surface  The 2nd and 3rd portions are normal  Retroversion is unremarkable  Biopsies taken of the gastric antrum body and fundus to rule out H pylori  There is excellent hemostasis    She tolerated the procedure well and was stable throughout  My impression:  Minimal bulbar duodenitis, status post gastric biopsies    RECOMMENDATIONS:  Follow up biopsy results in 1 week  Continue current medical management    COMPLICATIONS:  None; patient tolerated the procedure well  DISPOSITION: PACU  CONDITION: Stable    Gunnar Miller MD  8/23/2018,10:16 AM        Portions of the record may have been created with voice recognition software   Occasional wrong word or "sound a like" substitutions may have occurred due to the inherent limitations of voice recognition software   Read the chart carefully and recognize, using context, where substitutions have occurred

## 2018-08-23 NOTE — DISCHARGE INSTRUCTIONS
Endoscopy       My impression:  Minimal bulbar duodenitis, status post gastric biopsies     RECOMMENDATIONS:  Follow up biopsy results in 1 week  Continue current medical management    Colonoscopy     My impression:  Hepatic flexure polyp, status post hot biopsy polypectomy     RECOMMENDATIONS:  Call in 1 week for biopsy results  Repeat colonoscopy in 5 years         Upper Endoscopy   WHAT YOU NEED TO KNOW:   An upper endoscopy is also called an upper gastrointestinal (GI) endoscopy, or an esophagogastroduodenoscopy (EGD)  You may feel bloated, gassy, or have some abdominal discomfort after your procedure  Your throat may be sore for 24 to 36 hours  You may burp or pass gas from air that is still inside your body  DISCHARGE INSTRUCTIONS:   Call 911 for any of the following:   · You have sudden chest pain or trouble breathing  Seek care immediately if:   · You feel dizzy or faint  · You have trouble swallowing  · Your bowel movements are very dark or black  · Your abdomen is hard and firm and you have severe pain  · You vomit blood  Contact your healthcare provider if:   · You feel full or bloated and cannot burp or pass gas  · You have not had a bowel movement for 3 days after your procedure  · You have neck pain  · You have a fever or chills  · You have nausea or are vomiting  · You have a rash or hives  · You have questions or concerns about your endoscopy  Relieve a sore throat:  Suck on throat lozenges or crushed ice  Gargle with a small amount of warm salt water  Mix 1 teaspoon of salt and 1 cup of warm water to make salt water  Relieve gas and discomfort from bloating:  Lie on your right side with a heating pad on your abdomen  Take short walks to help pass gas  Eat small meals until bloating is relieved  Rest after your procedure: You have been given medicine to relax you  Do not  drive or make important decisions until the day after your procedure   Return to your normal activity as directed  You can usually return to work the day after your procedure  Follow up with your healthcare provider as directed:  Write down your questions so you remember to ask them during your visits  © 2017 [address] is for End User's use only and may not be sold, redistributed or otherwise used for commercial purposes  All illustrations and images included in CareNotes® are the copyrighted property of A [employer] M [employer]  or [employer] Pared[employer]  The above information is an  only  It is not intended as medical advice for individual conditions or treatments  Talk to your doctor, nurse or pharmacist before following any medical regimen to see if it is safe and effective for you  Colonoscopy   WHAT YOU NEED TO KNOW:   A colonoscopy is a procedure to examine the inside of your colon (intestine) with a scope  Polyps or tissue growths may have been removed during your colonoscopy  It is normal to feel bloated and to have some abdominal discomfort  You should be passing gas  If you have hemorrhoids or you had polyps removed, you may have a small amount of bleeding  DISCHARGE INSTRUCTIONS:   Seek care immediately if:   · You have a large amount of bright red blood in your bowel movements  · Your abdomen is hard and firm and you have severe pain  · You have sudden trouble breathing  Contact your healthcare provider if:   · You develop a rash or hives  · You have a fever within 24 hours of your procedure       · You have not had a bowel movement for 3 days after your procedure  · You have questions or concerns about your condition or care  Activity:   · Do not lift, strain, or run  for 3 days after your procedure  · Rest after your procedure  You have been given medicine to relax you  Do not  drive or make important decisions until the day after your procedure  Return to your normal activity as directed      · Relieve gas and discomfort from bloating  by lying on your right side with a heating pad on your abdomen  You may need to take short walks to help the gas move out  Eat small meals until bloating is relieved  If you had polyps removed: For 7 days after your procedure:  · Do not  take aspirin  · Do not  go on long car rides  Follow up with your healthcare provider as directed:  Write down your questions so you remember to ask them during your visits  © 2017 0814 Angela Vázquez is for End User's use only and may not be sold, redistributed or otherwise used for commercial purposes  All illustrations and images included in CareNotes® are the copyrighted property of A [employer] M [employer]  or [employer] Paredes  The above information is an  only  It is not intended as medical advice for individual conditions or treatments  Talk to your doctor, nurse or pharmacist before following any medical regimen to see if it is safe and effective for you

## 2018-08-23 NOTE — OP NOTE
Thank Postoperative Note  PATIENT NAME: Lee Dodd  : 1985  MRN: [id number]  [facility]    Surgery Date: 2018    POST-OP DIAGNOSIS: See the impression below    SEDATION: Monitored anesthesia care, check anesthesia records    PHYSICAL EXAM:  Vitals:    18 0941   BP: 105/70   Pulse: 65   Resp: 12   Temp: 98 1 °F (36 7 °C)   SpO2: 97%     Body mass index is 20 19 kg/m²  General: NAD  Heart: S1 & S2 normal, RRR  Lungs: CTA, No rales or rhonchi  Abdomen: Soft, nontender, nondistended, good bowel sounds    CONSENT:  Informed consent was obtained for the procedure, including sedation after explaining the risks and benefits of the procedure  Risks including but not limited to bleeding, perforation, infection, aspiration were discussed in detail  Also explained about less than 100%$ sensitivity with the exam and other alternatives  DESCRIPTION:   Procedure:  Fiberoptic colonoscopy with hot biopsy polypectomy    Indications:  35-year-old female with rectal bleeding of undetermined etiology    ASA 2    Premedicated with mac anesthesia    Patient is identified by me  She is examined prior to the procedure and found to be in stable condition  She is attached to the cardiac monitor and pulse oximeter and placed in the left lateral position  After adequate intravenous sedation the Olympus video colonoscope was inserted and passed easily the cecum  The ileocecal valve and appendiceal orifice was identified and the scope slowly withdrawn with excellent circumferential visualization of the mucosa  The preparation is adequate  At the hepatic flexure there is a diminutive polyp removed completely with hot biopsy technique with excellent guillermina  No other inflammatory neoplastic or vascular lesions are noted  Retroversion is normal   She tolerated the procedure well and was stable throughout      My impression:  Hepatic flexure polyp, status post hot biopsy polypectomy    RECOMMENDATIONS:  Call in 1 week for biopsy results  Repeat colonoscopy in 5 years    COMPLICATIONS:  None; patient tolerated the procedure well  DISPOSITION: PACU  CONDITION: Stable    Ely Stout MD  8/23/2018,10:29 AM        Portions of the record may have been created with voice recognition software   Occasional wrong word or "sound a like" substitutions may have occurred due to the inherent limitations of voice recognition software   Read the chart carefully and recognize, using context, where substitutions have occurred

## 2018-08-23 NOTE — ANESTHESIA POSTPROCEDURE EVALUATION
Post-Op Assessment Note      CV Status:  Stable    Mental Status:  Alert and awake    Hydration Status:  Euvolemic    PONV Controlled:  Controlled    Airway Patency:  Patent    Post Op Vitals Reviewed: Yes          Staff: CRNA, Anesthesiologist           BP (!) 74/42 (08/23/18 1033)    Temp 97 6 °F (36 4 °C) (08/23/18 1033)    Pulse 66 (08/23/18 1033)   Resp 20 (08/23/18 1033)    SpO2 93 % (08/23/18 1033)

## 2018-08-24 ENCOUNTER — OFFICE VISIT (OUTPATIENT)
Dept: URGENT CARE | Facility: CLINIC | Age: 33
End: 2018-08-24
Payer: COMMERCIAL

## 2018-08-24 VITALS
DIASTOLIC BLOOD PRESSURE: 80 MMHG | RESPIRATION RATE: 18 BRPM | WEIGHT: 122 LBS | HEIGHT: 63 IN | SYSTOLIC BLOOD PRESSURE: 128 MMHG | OXYGEN SATURATION: 99 % | BODY MASS INDEX: 21.62 KG/M2 | TEMPERATURE: 98.6 F | HEART RATE: 76 BPM

## 2018-08-24 DIAGNOSIS — L23.7 POISON IVY: Primary | ICD-10-CM

## 2018-08-24 PROCEDURE — 99283 EMERGENCY DEPT VISIT LOW MDM: CPT | Performed by: PHYSICIAN ASSISTANT

## 2018-08-24 PROCEDURE — G0382 LEV 3 HOSP TYPE B ED VISIT: HCPCS | Performed by: PHYSICIAN ASSISTANT

## 2018-08-24 RX ORDER — PREDNISONE 10 MG/1
TABLET ORAL
Qty: 30 TABLET | Refills: 0 | Status: SHIPPED | OUTPATIENT
Start: 2018-08-24 | End: 2018-12-10 | Stop reason: ALTCHOICE

## 2018-08-24 NOTE — PATIENT INSTRUCTIONS
1  Poison Ivy  -take steroid taper as directed with food  -use benadryl  -Follow-up with PCP within 1 week    -Go to ER with worsening symptoms, worsening rash, fever, or any new concerns    Poison Ivy   WHAT YOU NEED TO KNOW:   Poison ivy is a plant that can cause an itchy, uncomfortable rash on your skin  Poison ivy grows as a shrub or vine in woods, fields, and areas of thick [place]  It has 3 bright green leaves on each stem that turn red in lennie  DISCHARGE INSTRUCTIONS:   Medicines:   · Antiseptic or drying creams or ointments: These medicines may be used to dry out the rash and decrease the itching  These products may be available without a doctor's order  · Steroids: This medicine helps decrease itching and inflammation  It can be given as a cream to apply to your skin or as a pill  · Antihistamines: This medicine may help decrease itching and help you sleep  It is available without a doctor's order  · Take your medicine as directed  Contact your healthcare provider if you think your medicine is not helping or if you have side effects  Tell him or her if you are allergic to any medicine  Keep a list of the medicines, vitamins, and herbs you take  Include the amounts, and when and why you take them  Bring the list or the pill bottles to follow-up visits  Carry your medicine list with you in case of an emergency  Follow up with your healthcare provider as directed:  Write down your questions so you remember to ask them during your visits  How your poison ivy rash spreads: You cannot spread poison ivy by touching your rash or the liquid from your blisters  Poison ivy is spread only if you scratch your skin while it still has oil on it  You may think your rash is spreading because new rashes appear over a number of days   This happens because areas covered by thin skin break out in a rash first  Your face or forearms may develop a rash before thicker areas, such as the palms of your hands    Self-care:   · Keep your rash clean and dry:  Wash it with soap and water  Gently pat it dry with a clean towel  · Try not to scratch or rub your rash: This can cause your skin to become infected  · Use a compress on your rash:  Dip a clean washcloth in cool water  Wring it out and place it on your rash  Leave the washcloth on your skin for 15 minutes  Do this at least 3 times per day  · Take a cornstarch or oatmeal bath: If your rash is too large to cover with wet washcloths, take 3 or 4 cornstarch baths daily  Mix 1 pound of cornstarch with a little water to make a paste  Add the paste to a tub full of water and mix well  You may also use colloidal oatmeal in the bath water  Use lukewarm water  Avoid hot water because it may cause your itching to increase  Prevent a poison ivy rash in the future:   · Wear skin protection:  Wear long pants, a long-sleeved shirt, and gloves  Use a skin block lotion to protect your skin from poison ivy oil  You can find this at a drugstore without a prescription  · Wash clothing after possible exposure: If you think you have been near a poison ivy plant, wash the clothes you were wearing separately from other clothes  Rinse the washing machine well after you take the clothes out  Scrub boots and shoes with warm, soapy water  Dry clean items and clothing that you cannot wash in water  Poison ivy oil is sticky and can stay on surfaces for a long time  It can cause a new rash even years later  · Bathe your pet:  Use warm water and shampoo on your pet's fur  This will prevent the spread of oil to your skin, car, and home  Wear long sleeves, long pants, and gloves while washing pets or any items that may have oil on them  · Reduce exposure to poison ivy:  Do not touch plants that look like poison ivy  Keep your yard free of poison ivy  While protecting your skin, remove the plant and the roots  Place them in a plastic bag and seal the bag tightly       · Do not burn poison ivy plants: This can spread the oil through the air  If you breathe the oil into your lungs, you could have swelling and serious breathing problems  Oil that clings to the fire manuel can land on your skin and cause a rash  Contact your healthcare provider if:   · You have pus, soft yellow scabs, or tenderness on the rash  · The itching gets worse or keeps you awake at night  · The rash covers more than 1/4 of your skin or spreads to your eyes, mouth, or genital area  · The rash is not better after 2 to 3 weeks  · You have tender, swollen glands on the sides of your neck  · You have swelling in your arms and legs  · You have questions or concerns about your condition or care  Seek care immediately or call 911 if:   · You have a fever  · You have redness, swelling, and tenderness around the rash  · You have trouble breathing  © 2017 [address]  Information is for End User's use only and may not be sold, redistributed or otherwise used for commercial purposes  All illustrations and images included in CareNotes® are the copyrighted property of A [employer] M , Inc  or Mat Paredes  The above information is an  only  It is not intended as medical advice for individual conditions or treatments  Talk to your doctor, nurse or pharmacist before following any medical regimen to see if it is safe and effective for you
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
## 2018-08-30 ENCOUNTER — TELEPHONE (OUTPATIENT)
Dept: GASTROENTEROLOGY | Facility: CLINIC | Age: 33
End: 2018-08-30

## 2018-08-30 NOTE — TELEPHONE ENCOUNTER
Results given    Will take famotidine 20 mg p o  b i d   Discussed colon cancer risk given the findings on her polyp

## 2018-12-10 ENCOUNTER — OFFICE VISIT (OUTPATIENT)
Dept: INTERNAL MEDICINE CLINIC | Facility: CLINIC | Age: 33
End: 2018-12-10
Payer: COMMERCIAL

## 2018-12-10 ENCOUNTER — OFFICE VISIT (OUTPATIENT)
Dept: OBGYN CLINIC | Age: 33
End: 2018-12-10
Payer: COMMERCIAL

## 2018-12-10 VITALS
WEIGHT: 132.2 LBS | HEART RATE: 88 BPM | BODY MASS INDEX: 23.42 KG/M2 | SYSTOLIC BLOOD PRESSURE: 120 MMHG | HEIGHT: 63 IN | RESPIRATION RATE: 16 BRPM | DIASTOLIC BLOOD PRESSURE: 78 MMHG | OXYGEN SATURATION: 98 % | TEMPERATURE: 98.2 F

## 2018-12-10 VITALS
DIASTOLIC BLOOD PRESSURE: 68 MMHG | BODY MASS INDEX: 23.27 KG/M2 | WEIGHT: 131.38 LBS | SYSTOLIC BLOOD PRESSURE: 122 MMHG

## 2018-12-10 DIAGNOSIS — F41.9 ANXIETY DISORDER, UNSPECIFIED TYPE: Primary | ICD-10-CM

## 2018-12-10 DIAGNOSIS — F33.9 EPISODE OF RECURRENT MAJOR DEPRESSIVE DISORDER, UNSPECIFIED DEPRESSION EPISODE SEVERITY (HCC): ICD-10-CM

## 2018-12-10 DIAGNOSIS — D06.1 CARCINOMA IN SITU OF EXOCERVIX: Primary | ICD-10-CM

## 2018-12-10 DIAGNOSIS — Z23 NEED FOR INFLUENZA VACCINATION: ICD-10-CM

## 2018-12-10 DIAGNOSIS — E55.9 VITAMIN D DEFICIENCY: ICD-10-CM

## 2018-12-10 PROCEDURE — 99213 OFFICE O/P EST LOW 20 MIN: CPT | Performed by: OBSTETRICS & GYNECOLOGY

## 2018-12-10 PROCEDURE — 3008F BODY MASS INDEX DOCD: CPT | Performed by: PHYSICIAN ASSISTANT

## 2018-12-10 PROCEDURE — 87624 HPV HI-RISK TYP POOLED RSLT: CPT | Performed by: OBSTETRICS & GYNECOLOGY

## 2018-12-10 PROCEDURE — 88305 TISSUE EXAM BY PATHOLOGIST: CPT | Performed by: PATHOLOGY

## 2018-12-10 PROCEDURE — 99213 OFFICE O/P EST LOW 20 MIN: CPT | Performed by: PHYSICIAN ASSISTANT

## 2018-12-10 PROCEDURE — G0145 SCR C/V CYTO,THINLAYER,RESCR: HCPCS | Performed by: OBSTETRICS & GYNECOLOGY

## 2018-12-10 PROCEDURE — 90686 IIV4 VACC NO PRSV 0.5 ML IM: CPT

## 2018-12-10 PROCEDURE — 4004F PT TOBACCO SCREEN RCVD TLK: CPT | Performed by: PHYSICIAN ASSISTANT

## 2018-12-10 PROCEDURE — 90471 IMMUNIZATION ADMIN: CPT

## 2018-12-10 RX ORDER — BUPROPION HYDROCHLORIDE 150 MG/1
150 TABLET, EXTENDED RELEASE ORAL 2 TIMES DAILY
Qty: 60 TABLET | Refills: 3 | Status: SHIPPED | OUTPATIENT
Start: 2018-12-10

## 2018-12-10 RX ORDER — FAMOTIDINE 10 MG
10 TABLET ORAL 2 TIMES DAILY
COMMUNITY

## 2018-12-10 NOTE — PATIENT INSTRUCTIONS
Since patient has weaned herself off the fluoxetine, and would like to proceed with smoking cessation along with the medication for anxiety/depression, will institute Wellbutrin  mg to start with 1 a day for 1 week then increase to 2 daily  Patient to have vitamin-D level done today  Will review results when they are available  Schedule follow-up 6 months or sooner as needed

## 2018-12-10 NOTE — PATIENT INSTRUCTIONS
Cervical Intraepithelial Neoplasia   WHAT YOU NEED TO KNOW:   What is cervical intraepithelial neoplasia? Cervical intraepithelial neoplasia occurs when there are changes in the cells on the surface of your cervix  It is also called cervical dysplasia, or ALEXI  The cervix is where the lower part of the uterus meets the vagina  ALEXI may develop into cancer if it is not found and treated  What causes ALEXI?  ALEXI is most often caused by a human papillomavirus (HPV) infection  HPV is a sexually transmitted infection (STI)  The following may increase your risk for ALEXI:  · Cigarette smoking    · Exposure to certain medicines, such as diethylstilbestrol (HANNAH)    · Having a child before age 12     · Having sex for the first time before age 25    · Multiple sexual partners  How is ALEXI diagnosed? ALEXI is usually found after one or more of the following tests:  · A Papanicolaou (Pap) test  is done during a pelvic exam to check for abnormal cells in the cervix  Cells are collected and sent to a lab for tests  The sample may also be checked for HPV  · A colposcopy  is a procedure where your healthcare provider uses a small scope with a light to look more closely at your cervix and vagina  · A biopsy  is when a small sample of tissue is removed from your cervix  It may be taken during a colposcopy  The sample is sent to a lab and tested for abnormal cells  How do healthcare providers classify ALEXI? Healthcare providers classify ALEXI based upon how thick and how deep abnormal cells are found on your cervix  · ALEXI I  is also called mild ALEXI  This occurs when there are only a few abnormal cells found on the surface of your cervix  · ALEXI II  is also called moderate ALEXI  This occurs when ? of the lining of your cervix has abnormal cells  · ALEXI III  is also called severe ALEXI or carcinoma-in-situ  This means that the entire lining of your cervix has abnormal cells  This often progresses to become cervical cancer    How is ALEXI treated? No treatment is usually needed for mild ALEXI  Your healthcare provider may want you to have more frequent Pap tests  You may also need to have more frequent colposcopies to see if the cells have changed  If you have moderate or severe ALEXI, you may need surgery to burn, freeze, or remove the abnormal cells  How can I manage my condition? · Have regular Pap tests  Ask your healthcare provider how often you should have a Pap test      · Do not smoke  If you smoke, it is never too late to quit  Smoking increases the risk of ALEXI  Ask your healthcare provider for information if you need help quitting  How can I prevent another HPV infection? · Ask about a vaccination  to reduce the risk of HPV infection  If you are younger than 32years old, you may be able to receive a vaccine to prevent an HPV infection  · Use a condom  when you have sex  When should I contact my healthcare provider? · You have a fever  · You have chills, a cough, or feel weak and achy  · You have heavy vaginal bleeding (soaking 1 pad every hour)  · You have yellow or foul smelling discharge from your vagina  · You have severe abdominal pain or vomiting  · You have questions or concerns about your condition or care  When should I seek immediate care or call 911? · You have sudden shortness of breath  CARE AGREEMENT:   You have the right to help plan your care  Learn about your health condition and how it may be treated  Discuss treatment options with your caregivers to decide what care you want to receive  You always have the right to refuse treatment  The above information is an  only  It is not intended as medical advice for individual conditions or treatments  Talk to your doctor, nurse or pharmacist before following any medical regimen to see if it is safe and effective for you    © 2017 [employer] Information is for End User's use only and may not be sold, redistributed or otherwise used for commercial purposes  All illustrations and images included in CareNotes® are the copyrighted property of A D A M , Inc  or Mat Paredes
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
## 2018-12-10 NOTE — PROGRESS NOTES
Assessment/Plan:    Carcinoma in situ of exocervix  Pap and ECC collected  Will call with results       Diagnoses and all orders for this visit:    Carcinoma in situ of exocervix    Other orders  -     Liquid-based pap, screening  -     Tissue Exam          Subjective:      Patient ID: Gaurav Coffman is a 35 y o  female  Patient here for 6 month follow up from leep done 6/5/18  repap & ECC today  leep showed CIN3+ margins  Gynecologic Exam   The patient's pertinent negatives include no genital itching, genital lesions, genital odor, genital rash, pelvic pain, vaginal bleeding or vaginal discharge  Pertinent negatives include no chills, constipation, diarrhea, fever, nausea, sore throat or vomiting  The following portions of the patient's history were reviewed and updated as appropriate:   She  has a past medical history of Anxiety; Asthma; Depression; Endometriosis; Enlarged ovary; GERD (gastroesophageal reflux disease); HPV (human papilloma virus) infection; and PTSD (post-traumatic stress disorder)  She   Patient Active Problem List    Diagnosis Date Noted    Abdominal pain, epigastric 08/20/2018    Dyspepsia 08/17/2018    BRBPR (bright red blood per rectum) 08/17/2018    Epigastric pain 08/17/2018    External hemorrhoid 08/17/2018    Rectal bleeding 08/13/2018    Vitamin D deficiency 08/13/2018    Carcinoma in situ of exocervix 05/22/2018    Cervical high risk HPV (human papillomavirus) test positive 05/22/2018    Encounter for gynecological examination (general) (routine) with abnormal findings 05/04/2018    Pelvic pain 05/04/2018    Episode of recurrent major depressive disorder ([facility] [address] ) 04/04/2018    Gastroesophageal reflux disease 04/04/2018    Intractable chronic migraine without aura 01/09/2015    Anxiety disorder 08/08/2014     She  has a past surgical history that includes Colposcopy and pr colonoscopy flx dx w/collj spec when pfrmd (N/A, 8/23/2018)    Her family history includes Diabetes in her sister; Heart disease in her maternal grandfather; Hypothyroidism in her mother and sister; Lung cancer in her paternal grandfather; Migraines in her sister  She  reports that she has been smoking Cigarettes  She has a 14 00 pack-year smoking history  She has never used smokeless tobacco  She reports that she drinks alcohol  She reports that she uses drugs, including Marijuana, about 7 times per week  Current Outpatient Prescriptions   Medication Sig Dispense Refill    cetirizine (ZyrTEC) 10 mg tablet Take 10 mg by mouth daily      Digestive Enzymes (ENZYME DIGEST PO) Take by mouth      ibuprofen (MOTRIN) 200 mg tablet Take by mouth every 6 (six) hours as needed for mild pain      Probiotic Product (PROBIOTIC-10 PO) Take by mouth      FLUoxetine (PROzac) 40 MG capsule Take 1 capsule (40 mg total) by mouth daily (Patient not taking: Reported on 12/10/2018 ) 30 capsule 4    predniSONE 10 mg tablet Take 5 pills x 2 days, 4 pills x 2 days, 3 pills x 2 days, 2 pills x 2 days, then 1 pill x 2 days then stop (Patient not taking: Reported on 12/10/2018 ) 30 tablet 0     No current facility-administered medications for this visit  Current Outpatient Prescriptions on File Prior to Visit   Medication Sig    cetirizine (ZyrTEC) 10 mg tablet Take 10 mg by mouth daily    Digestive Enzymes (ENZYME DIGEST PO) Take by mouth    ibuprofen (MOTRIN) 200 mg tablet Take by mouth every 6 (six) hours as needed for mild pain    Probiotic Product (PROBIOTIC-10 PO) Take by mouth    FLUoxetine (PROzac) 40 MG capsule Take 1 capsule (40 mg total) by mouth daily (Patient not taking: Reported on 12/10/2018 )    predniSONE 10 mg tablet Take 5 pills x 2 days, 4 pills x 2 days, 3 pills x 2 days, 2 pills x 2 days, then 1 pill x 2 days then stop (Patient not taking: Reported on 12/10/2018 )     No current facility-administered medications on file prior to visit  She has No Known Allergies       Review of Systems   Constitutional: Negative for activity change, appetite change, chills, fatigue and fever  HENT: Negative for rhinorrhea, sneezing and sore throat  Eyes: Negative for visual disturbance  Respiratory: Negative for cough, shortness of breath and wheezing  Cardiovascular: Negative for chest pain, palpitations and leg swelling  Gastrointestinal: Negative for abdominal distention, constipation, diarrhea, nausea and vomiting  Genitourinary: Negative for difficulty urinating, pelvic pain and vaginal discharge  Neurological: Negative for syncope and light-headedness  Objective:      /68 (BP Location: Right arm, Patient Position: Sitting, Cuff Size: Standard)   Wt 59 6 kg (131 lb 6 oz)   LMP 11/30/2018 (Exact Date)   Breastfeeding? No   BMI 23 27 kg/m²          Physical Exam   Genitourinary: There is no rash, tenderness or lesion on the right labia  There is no rash, tenderness or lesion on the left labia  No bleeding in the vagina  No signs of injury around the vagina  No vaginal discharge found     Genitourinary Comments: Cervix slightly asymmetrical  Normal marianne appearance    Pap and ECC collected

## 2018-12-11 LAB
HPV HR 12 DNA CVX QL NAA+PROBE: NEGATIVE
HPV16 DNA CVX QL NAA+PROBE: NEGATIVE
HPV18 DNA CVX QL NAA+PROBE: NEGATIVE

## 2018-12-13 LAB
LAB AP GYN PRIMARY INTERPRETATION: NORMAL
LAB AP LMP: NORMAL
Lab: NORMAL
PATH INTERP SPEC-IMP: NORMAL

## 2018-12-17 ENCOUNTER — TELEPHONE (OUTPATIENT)
Dept: OBGYN CLINIC | Age: 33
End: 2018-12-17

## 2018-12-17 NOTE — TELEPHONE ENCOUNTER
Called patricio to advise of neg pap&hpv results also to advise of ECC, CIN1  Per ktm repeat pap&hpv & also ecc in 6 months  Patient already has annual scheduled for 5/7/18 mikayla/ cecilia, so advise will do at this appt  Unable to physically speak with patient so left detailed voicemail advising patient of above, also advised if any further questions or concerns to give our office a call  [facility] office number provided

## 2018-12-17 NOTE — TELEPHONE ENCOUNTER
----- Message from Pelon Das MD sent at 12/15/2018 11:51 AM EST -----  Pap/HPV negative  - fungal organisms asymptomatic    ECC ALEXI 1    Pap/HPV ECC in 6 months   Annual scheduled 5/7/18 with Alina

## 2020-06-11 ENCOUNTER — TELEPHONE (OUTPATIENT)
Dept: INTERNAL MEDICINE CLINIC | Facility: CLINIC | Age: 35
End: 2020-06-11

## 2023-07-26 ENCOUNTER — TELEPHONE (OUTPATIENT)
Dept: GASTROENTEROLOGY | Facility: CLINIC | Age: 38
End: 2023-07-26